# Patient Record
Sex: FEMALE | Race: WHITE | Employment: FULL TIME | URBAN - METROPOLITAN AREA
[De-identification: names, ages, dates, MRNs, and addresses within clinical notes are randomized per-mention and may not be internally consistent; named-entity substitution may affect disease eponyms.]

---

## 2017-06-14 ENCOUNTER — OFFICE VISIT (OUTPATIENT)
Dept: INTERNAL MEDICINE CLINIC | Age: 57
End: 2017-06-14

## 2017-06-14 VITALS
WEIGHT: 236 LBS | BODY MASS INDEX: 37.04 KG/M2 | OXYGEN SATURATION: 95 % | DIASTOLIC BLOOD PRESSURE: 74 MMHG | SYSTOLIC BLOOD PRESSURE: 130 MMHG | HEART RATE: 85 BPM | HEIGHT: 67 IN

## 2017-06-14 DIAGNOSIS — Z00.00 ANNUAL PHYSICAL EXAM: Primary | ICD-10-CM

## 2017-06-14 PROCEDURE — 99396 PREV VISIT EST AGE 40-64: CPT | Performed by: INTERNAL MEDICINE

## 2017-06-29 ENCOUNTER — HOSPITAL ENCOUNTER (OUTPATIENT)
Dept: MAMMOGRAPHY | Age: 57
Discharge: HOME | End: 2017-06-29
Attending: INTERNAL MEDICINE | Admitting: INTERNAL MEDICINE

## 2017-06-29 DIAGNOSIS — Z12.31 VISIT FOR SCREENING MAMMOGRAM: ICD-10-CM

## 2017-07-14 RX ORDER — LEVOTHYROXINE SODIUM 88 MCG
TABLET ORAL
Qty: 30 TABLET | Refills: 5 | Status: SHIPPED | OUTPATIENT
Start: 2017-07-14 | End: 2018-01-29 | Stop reason: SDUPTHER

## 2017-08-22 ENCOUNTER — OFFICE VISIT (OUTPATIENT)
Dept: GYNECOLOGY | Age: 57
End: 2017-08-22

## 2017-08-22 VITALS
RESPIRATION RATE: 17 BRPM | HEIGHT: 68 IN | HEART RATE: 94 BPM | WEIGHT: 255 LBS | TEMPERATURE: 98 F | BODY MASS INDEX: 38.65 KG/M2 | DIASTOLIC BLOOD PRESSURE: 78 MMHG | SYSTOLIC BLOOD PRESSURE: 134 MMHG

## 2017-08-22 DIAGNOSIS — N32.81 URGENCY-FREQUENCY SYNDROME: ICD-10-CM

## 2017-08-22 DIAGNOSIS — Z01.419 WELL WOMAN EXAM WITH ROUTINE GYNECOLOGICAL EXAM: Primary | ICD-10-CM

## 2017-08-22 PROCEDURE — 99386 PREV VISIT NEW AGE 40-64: CPT | Performed by: OBSTETRICS & GYNECOLOGY

## 2017-08-22 ASSESSMENT — ENCOUNTER SYMPTOMS
RESPIRATORY NEGATIVE: 1
GASTROINTESTINAL NEGATIVE: 1
EYES NEGATIVE: 1

## 2017-08-24 LAB
HPV COMMENT: NORMAL
HPV TYPE 16: NOT DETECTED
HPV TYPE 18: NOT DETECTED
HPVOH (OTHER TYPES): NOT DETECTED
URINE CULTURE, ROUTINE: NORMAL

## 2017-12-22 ENCOUNTER — OFFICE VISIT (OUTPATIENT)
Dept: INTERNAL MEDICINE CLINIC | Age: 57
End: 2017-12-22

## 2017-12-22 VITALS
HEART RATE: 77 BPM | SYSTOLIC BLOOD PRESSURE: 134 MMHG | DIASTOLIC BLOOD PRESSURE: 84 MMHG | WEIGHT: 258 LBS | BODY MASS INDEX: 39.23 KG/M2

## 2017-12-22 DIAGNOSIS — R03.0 ELEVATED BLOOD PRESSURE READING WITHOUT DIAGNOSIS OF HYPERTENSION: ICD-10-CM

## 2017-12-22 DIAGNOSIS — E03.9 ACQUIRED HYPOTHYROIDISM: Primary | ICD-10-CM

## 2017-12-22 DIAGNOSIS — E66.9 OBESITY (BMI 30-39.9): ICD-10-CM

## 2017-12-22 PROCEDURE — 99214 OFFICE O/P EST MOD 30 MIN: CPT | Performed by: INTERNAL MEDICINE

## 2017-12-22 RX ORDER — BUPROPION HYDROCHLORIDE 150 MG/1
150 TABLET ORAL EVERY MORNING
Qty: 30 TABLET | Refills: 3 | Status: SHIPPED | OUTPATIENT
Start: 2017-12-22 | End: 2018-02-23 | Stop reason: SDUPTHER

## 2017-12-22 ASSESSMENT — PATIENT HEALTH QUESTIONNAIRE - PHQ9
SUM OF ALL RESPONSES TO PHQ9 QUESTIONS 1 & 2: 0
1. LITTLE INTEREST OR PLEASURE IN DOING THINGS: 0
SUM OF ALL RESPONSES TO PHQ QUESTIONS 1-9: 0
2. FEELING DOWN, DEPRESSED OR HOPELESS: 0

## 2018-01-29 RX ORDER — LEVOTHYROXINE SODIUM 88 MCG
TABLET ORAL
Qty: 30 TABLET | Refills: 5 | Status: SHIPPED | OUTPATIENT
Start: 2018-01-29 | End: 2018-02-23 | Stop reason: SDUPTHER

## 2018-01-29 NOTE — TELEPHONE ENCOUNTER
From: Pilar Waller  Sent: 1/29/2018 7:49 AM EST  Subject: Medication Renewal Request    Jory Alberto would like a refill of the following medications:  SYNTHROID 88 MCG tablet Nazario Her MD]    Preferred pharmacy: [address] [place], [place] [address] [phone] - F [phone]    Comment:  I do not have any refills remaining.

## 2018-02-23 ENCOUNTER — OFFICE VISIT (OUTPATIENT)
Dept: INTERNAL MEDICINE CLINIC | Age: 58
End: 2018-02-23

## 2018-02-23 VITALS
WEIGHT: 246 LBS | BODY MASS INDEX: 37.28 KG/M2 | HEIGHT: 68 IN | SYSTOLIC BLOOD PRESSURE: 110 MMHG | HEART RATE: 82 BPM | DIASTOLIC BLOOD PRESSURE: 80 MMHG

## 2018-02-23 DIAGNOSIS — E66.9 OBESITY (BMI 30-39.9): Primary | ICD-10-CM

## 2018-02-23 PROCEDURE — 99213 OFFICE O/P EST LOW 20 MIN: CPT | Performed by: INTERNAL MEDICINE

## 2018-02-23 RX ORDER — LEVOTHYROXINE SODIUM 88 MCG
88 TABLET ORAL DAILY
Qty: 90 TABLET | Refills: 1 | Status: SHIPPED | OUTPATIENT
Start: 2018-02-23 | End: 2018-08-10 | Stop reason: SDUPTHER

## 2018-02-23 RX ORDER — BUPROPION HYDROCHLORIDE 300 MG/1
300 TABLET ORAL EVERY MORNING
Qty: 90 TABLET | Refills: 1 | Status: SHIPPED | OUTPATIENT
Start: 2018-02-23 | End: 2018-08-10 | Stop reason: SDUPTHER

## 2018-02-23 NOTE — PROGRESS NOTES
[facility] Primary Care  Office Visit   Renate Eng MD    2018    Lesly Jean  :1960    ASSESSMENT/PLAN:   Obesity (BMI 30-39. 9)  BMI decreased. Continue Wellbutrin  mg. Along with her lifestyle modifications. Discussed medications with patient who voiced understanding of their use and indications. All questions answered. No Follow-up on file. Chief Complaint   Patient presents with    Follow-up       HPI:   This 60 y.o. female here for follow-up of Obesity and elevated blood pressure without diagnosis of hypertension. .      Obesity: Started on Wellbutrin XL just prior to . Has had excellent response with control cravings. This cut out all snacking and has lost 12 pounds since the end of December. Initially had nausea when increased from 150-300 mg. But that has subsided. Denies any other side effects. Blood pressure: Patient is an monitoring at home and blood pressure has decreased since patient has begun to lose weight. ROS As per HPI. Patient Active Problem List   Diagnosis    Disc disease, degenerative, lumbar or lumbosacral    History of basal cell carcinoma    History of melanoma in situ    Urge incontinence of urine    History of rectal or anal cancer    Urethral stricture    Hypothyroid    Obesity (BMI 30-39. 9)    Elevated blood pressure reading without diagnosis of hypertension     Prior to Visit Medications    Medication Sig Taking? Authorizing Provider   buPROPion (WELLBUTRIN XL) 300 MG extended release tablet Take 1 tablet by mouth every morning Yes Renate Eng MD   SYNTHROID 88 MCG tablet Take 1 tablet by mouth daily Yes Renate Eng MD   LORazepam (ATIVAN) 0.5 MG tablet Take 1-2 tablets prior to procedures.  Yes Renate Eng MD       OBJECTIVE:  BP Readings from Last 3 Encounters:   18 110/80   17 134/84   17 134/78      Wt Readings from Last 3 Encounters:   18 246 lb (111.6 kg)

## 2018-07-24 ENCOUNTER — HOSPITAL ENCOUNTER (OUTPATIENT)
Dept: MAMMOGRAPHY | Age: 58
Discharge: HOME | End: 2018-07-24
Payer: COMMERCIAL

## 2018-07-24 DIAGNOSIS — Z12.31 VISIT FOR SCREENING MAMMOGRAM: ICD-10-CM

## 2018-07-24 PROCEDURE — 77063 BREAST TOMOSYNTHESIS BI: CPT

## 2018-08-09 NOTE — PROGRESS NOTES
[address] Internal Medicine  Patient Encounter   Sowmya Jones MD    8/10/2018    Ernestine Florez  :1960    ASSESSMENT/PLAN:   Acquired hypothyroidism  Symptomatically euthyroid. Continue current dose of levothyroxine. Check TSH and adjust dose if indicated. Obesity (BMI 30-39. 9)  Steady weight loss since startng wellbutrin XL. Continue medication. Discussed medications with patient who voiced understanding of their use and indications. All questions answered. Return in about 6 months (around 2/10/2019). Medical assistant triage:  Chief Complaint   Patient presents with    6 Month Follow-Up     HPI:   Patient presents for re-evaluation of   1. Acquired hypothyroidism    2. Obesity (BMI 30-39. 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 Today was last day at [employer]. There for 26 years. Involves travel, [place] to [place]. Hypothyroidism: Recent symptoms: none. She denies cold intolerance, heat intolerance, constipation and diarrhea. Patient is  taking her medication consistently on an empty stomach. Obesity: Continues to lose weight. (33#  Total) Averages about 1 pound/week. Attributes to wellbutrin helping her with cravings. Still walks for exercise. ROS As per HPI. Prior to Visit Medications    Medication Sig Taking? Authorizing Provider   buPROPion (WELLBUTRIN XL) 300 MG extended release tablet Take 1 tablet by mouth every morning Yes Sowmya Jones MD   SYNTHROID 88 MCG tablet Take 1 tablet by mouth daily Yes Sowmya Jones MD   LORazepam (ATIVAN) 0.5 MG tablet Take 1-2 tablets prior to procedures.   Sowmya Jones MD     History   Smoking Status    Never Smoker   Smokeless Tobacco    Never Used       OBJECTIVE:  BP Readings from Last 3 Encounters:   08/10/18 108/66   18 110/80   17 134/84      Wt Readings from Last 3 Encounters:   08/10/18 224 lb (101.6 kg)   18 246 lb (111.6 kg)   17 258 lb (117 kg)     Vitals:    08/10/18 1109

## 2018-08-10 ENCOUNTER — OFFICE VISIT (OUTPATIENT)
Dept: INTERNAL MEDICINE CLINIC | Age: 58
End: 2018-08-10

## 2018-08-10 VITALS
HEART RATE: 60 BPM | SYSTOLIC BLOOD PRESSURE: 108 MMHG | DIASTOLIC BLOOD PRESSURE: 66 MMHG | BODY MASS INDEX: 34.06 KG/M2 | RESPIRATION RATE: 16 BRPM | WEIGHT: 224 LBS

## 2018-08-10 DIAGNOSIS — E66.9 OBESITY (BMI 30-39.9): ICD-10-CM

## 2018-08-10 DIAGNOSIS — E03.9 ACQUIRED HYPOTHYROIDISM: Primary | ICD-10-CM

## 2018-08-10 PROBLEM — R03.0 ELEVATED BLOOD PRESSURE READING WITHOUT DIAGNOSIS OF HYPERTENSION: Status: RESOLVED | Noted: 2017-12-22 | Resolved: 2018-08-10

## 2018-08-10 PROCEDURE — 99213 OFFICE O/P EST LOW 20 MIN: CPT | Performed by: INTERNAL MEDICINE

## 2018-08-10 RX ORDER — BUPROPION HYDROCHLORIDE 300 MG/1
300 TABLET ORAL EVERY MORNING
Qty: 30 TABLET | Refills: 5 | Status: SHIPPED | OUTPATIENT
Start: 2018-08-10 | End: 2018-08-30 | Stop reason: SDUPTHER

## 2018-08-10 RX ORDER — LEVOTHYROXINE SODIUM 88 MCG
88 TABLET ORAL DAILY
Qty: 30 TABLET | Refills: 5 | Status: SHIPPED | OUTPATIENT
Start: 2018-08-10 | End: 2019-02-23 | Stop reason: SDUPTHER

## 2018-08-28 RX ORDER — BUPROPION HYDROCHLORIDE 300 MG/1
TABLET ORAL
Qty: 30 TABLET | Refills: 5 | OUTPATIENT
Start: 2018-08-28

## 2018-08-30 RX ORDER — BUPROPION HYDROCHLORIDE 300 MG/1
300 TABLET ORAL EVERY MORNING
Qty: 30 TABLET | Refills: 5 | Status: SHIPPED | OUTPATIENT
Start: 2018-08-30 | End: 2019-08-28 | Stop reason: SDUPTHER

## 2019-02-25 RX ORDER — LEVOTHYROXINE SODIUM 88 MCG
TABLET ORAL
Qty: 30 TABLET | Refills: 1 | Status: SHIPPED | OUTPATIENT
Start: 2019-02-25 | End: 2019-05-03 | Stop reason: SDUPTHER

## 2019-03-19 ENCOUNTER — OFFICE VISIT (OUTPATIENT)
Dept: INTERNAL MEDICINE CLINIC | Age: 59
End: 2019-03-19
Payer: COMMERCIAL

## 2019-03-19 VITALS
HEART RATE: 78 BPM | BODY MASS INDEX: 32.99 KG/M2 | WEIGHT: 217 LBS | SYSTOLIC BLOOD PRESSURE: 120 MMHG | DIASTOLIC BLOOD PRESSURE: 70 MMHG | OXYGEN SATURATION: 98 %

## 2019-03-19 DIAGNOSIS — E03.9 ACQUIRED HYPOTHYROIDISM: Primary | ICD-10-CM

## 2019-03-19 DIAGNOSIS — E66.9 OBESITY (BMI 30-39.9): ICD-10-CM

## 2019-03-19 DIAGNOSIS — R94.4 DECREASED GFR: ICD-10-CM

## 2019-03-19 PROCEDURE — G8417 CALC BMI ABV UP PARAM F/U: HCPCS | Performed by: INTERNAL MEDICINE

## 2019-03-19 PROCEDURE — 1036F TOBACCO NON-USER: CPT | Performed by: INTERNAL MEDICINE

## 2019-03-19 PROCEDURE — 99214 OFFICE O/P EST MOD 30 MIN: CPT | Performed by: INTERNAL MEDICINE

## 2019-03-19 PROCEDURE — G8482 FLU IMMUNIZE ORDER/ADMIN: HCPCS | Performed by: INTERNAL MEDICINE

## 2019-03-19 PROCEDURE — G8427 DOCREV CUR MEDS BY ELIG CLIN: HCPCS | Performed by: INTERNAL MEDICINE

## 2019-03-19 PROCEDURE — 3017F COLORECTAL CA SCREEN DOC REV: CPT | Performed by: INTERNAL MEDICINE

## 2019-03-19 ASSESSMENT — PATIENT HEALTH QUESTIONNAIRE - PHQ9
SUM OF ALL RESPONSES TO PHQ QUESTIONS 1-9: 0
2. FEELING DOWN, DEPRESSED OR HOPELESS: 0
1. LITTLE INTEREST OR PLEASURE IN DOING THINGS: 0
SUM OF ALL RESPONSES TO PHQ9 QUESTIONS 1 & 2: 0
SUM OF ALL RESPONSES TO PHQ QUESTIONS 1-9: 0

## 2019-05-03 RX ORDER — LEVOTHYROXINE SODIUM 88 MCG
TABLET ORAL
Qty: 30 TABLET | Refills: 2 | Status: SHIPPED | OUTPATIENT
Start: 2019-05-03 | End: 2019-05-07 | Stop reason: SDUPTHER

## 2019-05-13 ENCOUNTER — OFFICE VISIT (OUTPATIENT)
Dept: INTERNAL MEDICINE CLINIC | Age: 59
End: 2019-05-13
Payer: COMMERCIAL

## 2019-05-13 ENCOUNTER — TELEPHONE (OUTPATIENT)
Dept: INTERNAL MEDICINE CLINIC | Age: 59
End: 2019-05-13

## 2019-05-13 VITALS
OXYGEN SATURATION: 99 % | SYSTOLIC BLOOD PRESSURE: 124 MMHG | WEIGHT: 214 LBS | HEART RATE: 74 BPM | BODY MASS INDEX: 32.54 KG/M2 | TEMPERATURE: 98 F | RESPIRATION RATE: 16 BRPM | DIASTOLIC BLOOD PRESSURE: 76 MMHG

## 2019-05-13 DIAGNOSIS — A60.1 HERPESVIRAL INFECTION OF PERIANAL SKIN AND RECTUM: Primary | ICD-10-CM

## 2019-05-13 PROCEDURE — 3017F COLORECTAL CA SCREEN DOC REV: CPT | Performed by: INTERNAL MEDICINE

## 2019-05-13 PROCEDURE — G8427 DOCREV CUR MEDS BY ELIG CLIN: HCPCS | Performed by: INTERNAL MEDICINE

## 2019-05-13 PROCEDURE — 99213 OFFICE O/P EST LOW 20 MIN: CPT | Performed by: INTERNAL MEDICINE

## 2019-05-13 PROCEDURE — 1036F TOBACCO NON-USER: CPT | Performed by: INTERNAL MEDICINE

## 2019-05-13 PROCEDURE — G8417 CALC BMI ABV UP PARAM F/U: HCPCS | Performed by: INTERNAL MEDICINE

## 2019-05-13 RX ORDER — VALACYCLOVIR HYDROCHLORIDE 1 G/1
2000 TABLET, FILM COATED ORAL 3 TIMES DAILY
Qty: 21 TABLET | Refills: 0 | Status: SHIPPED | OUTPATIENT
Start: 2019-05-13 | End: 2019-08-28

## 2019-05-13 NOTE — PROGRESS NOTES
Progress Note:    Lizbeth Polo    5/13/2019    Chief Complaint   Patient presents with    Other     \"Bumps\" of anal area, first noticed on Thursday. No pain or itching. (s)Kaleb Polo has had 2 days of \"bumps\" keaton-rectally  Progression: same not resolving  Quality: bumpy, no pain/itching  Radiation: keaton-rectally  Severity: moderate, it's very concerning  Timing: suddenly noticed it  Moderation: none, denies hx of zostervax reception    Patient currently denies chest pain, SOB, NVD, FC, rash, malaise, rigor, dizziness/lightheadness, other pertinent ROS was also reviewed. /76 (Site: Right Upper Arm, Position: Sitting, Cuff Size: Large Adult)   Pulse 74 Comment: Regular  Temp 98 °F (36.7 °C) (Oral)   Resp 16   Wt 214 lb (97.1 kg)   SpO2 99% Comment: Room Air  BMI 32.54 kg/m²   Body mass index is 32.54 kg/m². Gen: Patient appears well groomed, well appearing  HEAD: Atraumatic, normocephalic,   Eyes: PERRLA, EOMI   Neck: supple, no thyroid nodule appreciated, no JVD  Chest: Clear to auscultation CAMDEN, unlabored breathing, normal expansion  Heart: Regular rate, regular rhythm, no murmur, no rub  Extremities: no edema, distal pulses intact  Patient was alert and oriented to person, place and time  Integument: ipsilateral vesicular rash in small clusters with surrounding erythema, perianally and in dermatomal distribution of S2 distribution, it DOES NOT cross midline    Assessment and Plan:    Aretha Helm was seen today for other. 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

## 2019-05-13 NOTE — PATIENT INSTRUCTIONS
Patient Education        Genital Herpes: Care Instructions  Your Care Instructions  Genital herpes is caused by a virus called herpes simplex. There are two types of this virus. Type 2 is the type that usually causes genital herpes. But type 1 can also cause it. Type 1 is the type that causes cold sores. Genital herpes is a sexually transmitted infection (STI). The most common way to get it is through sexual or other contact with someone who has herpes. For example, the virus can spread from a sore in the genital area to the lips. And it can spread from a cold sore on the lips to the genital area. Some people are surprised to find out that they have herpes or that they gave it to someone else. This is because a lot of people who have it don't know that they have it. They may not get sores or they may have sores that they cannot see. But the virus can still be spread by a person who does not have obvious sores or symptoms. There is no cure for herpes, but antiviral medicine can help you feel better and help prevent more outbreaks. This medicine may also lower the chance of spreading the virus. Follow-up care is a key part of your treatment and safety. Be sure to make and go to all appointments, and call your doctor if you are having problems. It's also a good idea to know your test results and keep a list of the medicines you take. How can you care for yourself at home? · Be safe with medicines. If your doctor prescribes medicine, take it exactly as prescribed. Call your doctor if you think you are having a problem with your medicine. You will get more details on the specific medicines your doctor prescribes. · To reduce the pain and itching from herpes sores:  ? Wash the area with water 3 or 4 times a day. ? Keep the sores clean and dry in between baths or showers. You may want to let the sores air dry. This may feel better than a towel. ? Wear cotton underwear. Cotton absorbs moisture well.   ? Take an in to your [facility] account. Enter E733 in the [employer] box to learn more about \"Genital Herpes: Care Instructions. \"     If you do not have an account, please click on the \"Sign Up Now\" link. Current as of: September 11, 2018  Content Version: 12.0  © 6107-9537 Healthwise, Incorporated. Care instructions adapted under license by [facility] ([facility]). If you have questions about a medical condition or this instruction, always ask your healthcare professional. [address] any warranty or liability for your use of this information.

## 2019-05-13 NOTE — TELEPHONE ENCOUNTER
Verified with Dr. Lesly Arreguin, dosing instructions for Valtrex is one tablet tid for seven days for quantity of 21. Shawnee Manley, pharmacist advised.

## 2019-05-15 LAB
GRAM STAIN RESULT: NORMAL
WOUND/ABSCESS: NORMAL

## 2019-05-16 ENCOUNTER — TELEPHONE (OUTPATIENT)
Dept: INTERNAL MEDICINE CLINIC | Age: 59
End: 2019-05-16

## 2019-05-16 NOTE — TELEPHONE ENCOUNTER
Patient returned Julia's call. She would like Julia to call her back with results at number provided. Thanks.

## 2019-08-05 RX ORDER — LEVOTHYROXINE SODIUM 88 MCG
TABLET ORAL
Qty: 30 TABLET | Refills: 2 | Status: SHIPPED | OUTPATIENT
Start: 2019-08-05 | End: 2020-01-24

## 2019-08-28 ENCOUNTER — OFFICE VISIT (OUTPATIENT)
Dept: INTERNAL MEDICINE CLINIC | Age: 59
End: 2019-08-28
Payer: COMMERCIAL

## 2019-08-28 VITALS
HEART RATE: 74 BPM | SYSTOLIC BLOOD PRESSURE: 114 MMHG | HEIGHT: 67 IN | OXYGEN SATURATION: 98 % | WEIGHT: 221 LBS | DIASTOLIC BLOOD PRESSURE: 76 MMHG | BODY MASS INDEX: 34.69 KG/M2

## 2019-08-28 DIAGNOSIS — G25.0 ESSENTIAL TREMOR: ICD-10-CM

## 2019-08-28 DIAGNOSIS — Z00.00 ANNUAL PHYSICAL EXAM: Primary | ICD-10-CM

## 2019-08-28 DIAGNOSIS — E03.9 ACQUIRED HYPOTHYROIDISM: ICD-10-CM

## 2019-08-28 DIAGNOSIS — Z13.220 LIPID SCREENING: ICD-10-CM

## 2019-08-28 DIAGNOSIS — R94.4 DECREASED GFR: ICD-10-CM

## 2019-08-28 DIAGNOSIS — Z13.1 SCREENING FOR DIABETES MELLITUS: ICD-10-CM

## 2019-08-28 LAB
BILIRUBIN URINE: NEGATIVE
BLOOD, URINE: NEGATIVE
CLARITY: CLEAR
COLOR: YELLOW
EPITHELIAL CELLS, UA: 1 /HPF (ref 0–5)
GLUCOSE URINE: NEGATIVE MG/DL
HYALINE CASTS: 1 /LPF (ref 0–8)
KETONES, URINE: NEGATIVE MG/DL
LEUKOCYTE ESTERASE, URINE: NEGATIVE
MICROSCOPIC EXAMINATION: NORMAL
NITRITE, URINE: NEGATIVE
PH UA: 6 (ref 5–8)
PROTEIN UA: NEGATIVE MG/DL
RBC UA: 1 /HPF (ref 0–4)
SPECIFIC GRAVITY UA: 1.01 (ref 1–1.03)
UROBILINOGEN, URINE: 0.2 E.U./DL
WBC UA: 1 /HPF (ref 0–5)

## 2019-08-28 PROCEDURE — 90471 IMMUNIZATION ADMIN: CPT | Performed by: INTERNAL MEDICINE

## 2019-08-28 PROCEDURE — 90472 IMMUNIZATION ADMIN EACH ADD: CPT | Performed by: INTERNAL MEDICINE

## 2019-08-28 PROCEDURE — 90686 IIV4 VACC NO PRSV 0.5 ML IM: CPT | Performed by: INTERNAL MEDICINE

## 2019-08-28 PROCEDURE — 99396 PREV VISIT EST AGE 40-64: CPT | Performed by: INTERNAL MEDICINE

## 2019-08-28 PROCEDURE — 90715 TDAP VACCINE 7 YRS/> IM: CPT | Performed by: INTERNAL MEDICINE

## 2019-08-28 RX ORDER — LORAZEPAM 0.5 MG/1
0.5 TABLET ORAL EVERY 6 HOURS PRN
COMMUNITY
End: 2021-01-01

## 2019-08-28 RX ORDER — BUPROPION HYDROCHLORIDE 300 MG/1
TABLET ORAL
Qty: 30 TABLET | Refills: 5 | Status: SHIPPED | OUTPATIENT
Start: 2019-08-28 | End: 2020-02-24

## 2019-08-28 NOTE — PROGRESS NOTES
History and Physical      Jackelyn Alejo  : 1960    Date of Service: 2019    Chief Complaint:   Jackelyn Alejo is a 60 y.o. female who presents for complete physical examination. HPI:   Here today for physical/wellness care. Mentions has used old Ativan 0.5 mg on flights if mild or window seat. Really helps. Has used 2 in the last year. (rx for anxiety for procedures)    Has noticed some tremor. Usually left hand. Was better on the ativan. 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 No tremor at rest.    Had rash between buttock in May and saw Dr. Karolina Colbert. Went away with antiviral. Never painful but looked looked herpes per Dr. Karolina Colbert and Andria Srivastava. Notes weight is creeping up. WB still helps with cravings but aware that with hr frequent travel and eating out needs to be more careful. Dr. Antonella Stevens every 9 months. Exercise: twice weekly 30-45 min walking    Patient Care Team:  Ty Coon MD as PCP - General (Internal Medicine)  Ty Coon MD as PCP - [facility] Empaneled Provider  Adams Iniguez MD as Consulting Physician (Oncology)  Mehdi Johnston MD (Urology)  Hector Parikh MD as Surgeon (Colon and Rectal Surgery)  Tabatha Lynn (Gynecology)  Cindy Riley MD as Consulting Physician (Radiation Oncology)  Gray Anna MD as Consulting Physician (Dermatology)  Mir Goodwin MD as Consulting Physician (Gastroenterology)    No LMP recorded.  Patient is postmenopausal..      Glucose  Glucose (mg/dL)   Date Value   08/10/2018 89     Lipids  Lab Results   Component Value Date    CHOL 205 (H) 08/10/2018    TRIG 130 08/10/2018    HDL 58 08/10/2018    LDLCALC 121 (H) 08/10/2018       Health Maintenance   Topic Date Due    Shingles Vaccine (1 of 2) 2009    DTaP/Tdap/Td vaccine (2 - Td) 06/10/2019    TSH testing  08/10/2019    Flu vaccine (1) 2019    Colon cancer screen colonoscopy  05/03/2020    Breast cancer screen  07/24/2020    Cervical cancer screen  08/22/2022    Lipid screen  08/10/2023    Hepatitis C screen  Completed    HIV screen  Completed    Pneumococcal 0-64 years Vaccine  Aged [employer] History   Administered Date(s) Administered    Influenza 10/12/2015    Influenza Virus Vaccine 10/30/2014    Influenza, Kassandra Band, 3 Years and older, IM (Fluzone, Afluria) 10/26/2016, 10/17/2017, 12/01/2018    Tdap (Boostrix, Adacel) 06/10/2009       No Known Allergies    Outpatient Medications Marked as Taking for the 8/28/19 encounter (Office Visit) with Cameron Solorio MD   Medication Sig Dispense Refill    LORazepam (ATIVAN) 0.5 MG tablet Take 0.5 mg by mouth every 6 hours as needed for Anxiety.       SYNTHROID 88 MCG tablet TAKE ONE TABLET BY MOUTH DAILY 30 tablet 2    buPROPion (WELLBUTRIN XL) 300 MG extended release tablet Take 1 tablet by mouth every morning 30 tablet 5       Past Medical History:   Diagnosis Date    Adenomatous colon polyp     Basal cell carcinoma of skin     left upper arm    Degenerative joint disease     Disc disease, degenerative, lumbar or lumbosacral     lumbar area 1997    Diverticulosis     Ectopic pregnancy     History of rectal or anal cancer 4/10    Squamous cell of anal canal, s/p chemo and radiation    Hypothyroid 9/12    Infertility     treated with pergonol for 5 years 0516-5687, 7 rounds of ivf    MELANOMA IN SITU 2006    left upper arm    Obesity     Postmenopausal 2010    Urinary incontinence      Past Surgical History:   Procedure Laterality Date    TUNNELED VENOUS PORT PLACEMENT      summer 2009; placement and removal    VARICOSE VEIN SURGERY  6/08     Family History   Problem Relation Age of Onset    High Blood Pressure Mother     Colon Cancer Father 61    High Blood Pressure Father     Hypertension Brother      Social History     Socioeconomic History    Marital status:      Spouse name: Eliecer Denise Normocephalic and atraumatic. Mouth/Throat: Oropharynx is clear and moist.   Eyes: Pupils are equal, round, and reactive to light. Conjunctivae and EOM are normal.   Neck: Normal range of motion. Neck supple. No thyromegaly present. Cardiovascular: Normal rate, regular rhythm and intact distal pulses. Pulmonary/Chest: Effort normal and breath sounds normal.   Abdominal: Soft. Bowel sounds are normal. She exhibits no mass. There is no hepatosplenomegaly. There is no tenderness. Musculoskeletal: She exhibits no edema. Lymphadenopathy:     She has no cervical adenopathy. Neurological: She is alert. Skin: Skin is warm and dry. No pallor. Psychiatric: She has a normal mood and affect. Assessment/Plan:  Annual PE/Wellness exam  Discussed age appropriate preventive care including healthy diet, daily exercise, immunizations and age & gender guided screening tests. Tdap and flu today  Screening labs  Work on diet. Weight creeping up. Essential tremor  Mild. Advised cut back on caffeine. 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 Patient wants to hold off at this time    Acquired hypothyroidism  -     TSH with Reflex; Future    Decreased GFR. Check UA and renal today. Return in about 6 months (around 2/28/2020).   Poncho Yuan MD

## 2019-09-05 ENCOUNTER — TELEPHONE (OUTPATIENT)
Dept: INTERNAL MEDICINE CLINIC | Age: 59
End: 2019-09-05

## 2019-09-06 ENCOUNTER — HOSPITAL ENCOUNTER (OUTPATIENT)
Dept: ULTRASOUND IMAGING | Age: 59
Discharge: HOME | End: 2019-09-06
Payer: COMMERCIAL

## 2019-09-06 ENCOUNTER — TELEPHONE (OUTPATIENT)
Dept: INTERNAL MEDICINE CLINIC | Age: 59
End: 2019-09-06

## 2019-09-06 DIAGNOSIS — R94.4 DECREASED GFR: ICD-10-CM

## 2019-09-06 PROCEDURE — 76770 US EXAM ABDO BACK WALL COMP: CPT

## 2019-12-17 ENCOUNTER — OFFICE VISIT (OUTPATIENT)
Dept: GYNECOLOGY | Age: 59
End: 2019-12-17
Payer: COMMERCIAL

## 2019-12-17 VITALS
HEART RATE: 70 BPM | RESPIRATION RATE: 17 BRPM | SYSTOLIC BLOOD PRESSURE: 122 MMHG | HEIGHT: 68 IN | WEIGHT: 224 LBS | BODY MASS INDEX: 33.95 KG/M2 | DIASTOLIC BLOOD PRESSURE: 76 MMHG

## 2019-12-17 DIAGNOSIS — Z01.419 WELL WOMAN EXAM WITH ROUTINE GYNECOLOGICAL EXAM: Primary | ICD-10-CM

## 2019-12-17 PROCEDURE — 99396 PREV VISIT EST AGE 40-64: CPT | Performed by: OBSTETRICS & GYNECOLOGY

## 2019-12-17 PROCEDURE — G8482 FLU IMMUNIZE ORDER/ADMIN: HCPCS | Performed by: OBSTETRICS & GYNECOLOGY

## 2019-12-17 ASSESSMENT — ENCOUNTER SYMPTOMS
BACK PAIN: 1
RESPIRATORY NEGATIVE: 1
EYES NEGATIVE: 1
GASTROINTESTINAL NEGATIVE: 1

## 2019-12-20 ENCOUNTER — HOSPITAL ENCOUNTER (OUTPATIENT)
Dept: MAMMOGRAPHY | Age: 59
Discharge: HOME | End: 2019-12-20
Payer: COMMERCIAL

## 2019-12-20 DIAGNOSIS — Z01.419 WELL WOMAN EXAM WITH ROUTINE GYNECOLOGICAL EXAM: ICD-10-CM

## 2019-12-20 LAB
HPV COMMENT: NORMAL
HPV TYPE 16: NOT DETECTED
HPV TYPE 18: NOT DETECTED
HPVOH (OTHER TYPES): NOT DETECTED

## 2019-12-20 PROCEDURE — 77063 BREAST TOMOSYNTHESIS BI: CPT

## 2020-01-01 ENCOUNTER — OFFICE VISIT (OUTPATIENT)
Dept: INTERNAL MEDICINE CLINIC | Age: 60
End: 2020-01-01
Payer: COMMERCIAL

## 2020-01-01 ENCOUNTER — NURSE ONLY (OUTPATIENT)
Dept: INTERNAL MEDICINE CLINIC | Age: 60
End: 2020-01-01
Payer: COMMERCIAL

## 2020-01-01 VITALS
DIASTOLIC BLOOD PRESSURE: 80 MMHG | HEIGHT: 68 IN | WEIGHT: 231 LBS | BODY MASS INDEX: 35.01 KG/M2 | SYSTOLIC BLOOD PRESSURE: 116 MMHG | RESPIRATION RATE: 14 BRPM | OXYGEN SATURATION: 97 % | TEMPERATURE: 97.6 F | HEART RATE: 83 BPM

## 2020-01-01 PROCEDURE — G8482 FLU IMMUNIZE ORDER/ADMIN: HCPCS | Performed by: INTERNAL MEDICINE

## 2020-01-01 PROCEDURE — 99396 PREV VISIT EST AGE 40-64: CPT | Performed by: INTERNAL MEDICINE

## 2020-01-01 PROCEDURE — 90471 IMMUNIZATION ADMIN: CPT | Performed by: INTERNAL MEDICINE

## 2020-01-01 PROCEDURE — 90750 HZV VACC RECOMBINANT IM: CPT | Performed by: INTERNAL MEDICINE

## 2020-01-24 RX ORDER — LEVOTHYROXINE SODIUM 88 MCG
TABLET ORAL
Qty: 30 TABLET | Refills: 5 | Status: SHIPPED | OUTPATIENT
Start: 2020-01-24 | End: 2020-08-02

## 2020-02-24 RX ORDER — BUPROPION HYDROCHLORIDE 300 MG/1
TABLET ORAL
Qty: 30 TABLET | Refills: 5 | Status: SHIPPED | OUTPATIENT
Start: 2020-02-24 | End: 2020-09-02

## 2020-06-02 ENCOUNTER — VIRTUAL VISIT (OUTPATIENT)
Dept: INTERNAL MEDICINE CLINIC | Age: 60
End: 2020-06-02
Payer: COMMERCIAL

## 2020-06-02 VITALS — WEIGHT: 225 LBS | BODY MASS INDEX: 34.21 KG/M2

## 2020-06-02 PROCEDURE — 99213 OFFICE O/P EST LOW 20 MIN: CPT | Performed by: INTERNAL MEDICINE

## 2020-06-02 PROCEDURE — 3017F COLORECTAL CA SCREEN DOC REV: CPT | Performed by: INTERNAL MEDICINE

## 2020-06-02 PROCEDURE — G8427 DOCREV CUR MEDS BY ELIG CLIN: HCPCS | Performed by: INTERNAL MEDICINE

## 2020-06-02 SDOH — ECONOMIC STABILITY: FOOD INSECURITY: WITHIN THE PAST 12 MONTHS, YOU WORRIED THAT YOUR FOOD WOULD RUN OUT BEFORE YOU GOT MONEY TO BUY MORE.: NEVER TRUE

## 2020-06-02 SDOH — ECONOMIC STABILITY: FOOD INSECURITY: WITHIN THE PAST 12 MONTHS, THE FOOD YOU BOUGHT JUST DIDN'T LAST AND YOU DIDN'T HAVE MONEY TO GET MORE.: NEVER TRUE

## 2020-06-02 SDOH — ECONOMIC STABILITY: INCOME INSECURITY: HOW HARD IS IT FOR YOU TO PAY FOR THE VERY BASICS LIKE FOOD, HOUSING, MEDICAL CARE, AND HEATING?: NOT HARD AT ALL

## 2020-06-02 ASSESSMENT — PATIENT HEALTH QUESTIONNAIRE - PHQ9
2. FEELING DOWN, DEPRESSED OR HOPELESS: 0
SUM OF ALL RESPONSES TO PHQ9 QUESTIONS 1 & 2: 0
SUM OF ALL RESPONSES TO PHQ QUESTIONS 1-9: 0
1. LITTLE INTEREST OR PLEASURE IN DOING THINGS: 0
SUM OF ALL RESPONSES TO PHQ QUESTIONS 1-9: 0

## 2020-08-02 RX ORDER — LEVOTHYROXINE SODIUM 88 MCG
TABLET ORAL
Qty: 30 TABLET | Refills: 4 | Status: SHIPPED
Start: 2020-08-02 | End: 2020-09-14 | Stop reason: CLARIF

## 2020-08-04 RX ORDER — LEVOTHYROXINE SODIUM 88 MCG
TABLET ORAL
Qty: 30 TABLET | Refills: 4 | OUTPATIENT
Start: 2020-08-04

## 2020-08-04 NOTE — TELEPHONE ENCOUNTER
Last appointment: 8/28/2019  Next appointment: 12/22/2020  Last refill: 08/03/2020    Spoke with pharmacy who advises medication previously refilled on 08/03/2020.   Duplicate request.

## 2020-08-07 DIAGNOSIS — E03.9 ACQUIRED HYPOTHYROIDISM: ICD-10-CM

## 2020-08-07 DIAGNOSIS — Z13.1 SCREENING FOR DIABETES MELLITUS: ICD-10-CM

## 2020-08-07 DIAGNOSIS — Z13.220 LIPID SCREENING: ICD-10-CM

## 2020-08-07 LAB
ANION GAP SERPL CALCULATED.3IONS-SCNC: 11 MMOL/L (ref 3–16)
BUN BLDV-MCNC: 24 MG/DL (ref 7–20)
CALCIUM SERPL-MCNC: 9.2 MG/DL (ref 8.3–10.6)
CHLORIDE BLD-SCNC: 104 MMOL/L (ref 99–110)
CHOLESTEROL, TOTAL: 218 MG/DL (ref 0–199)
CO2: 27 MMOL/L (ref 21–32)
CREAT SERPL-MCNC: 0.9 MG/DL (ref 0.6–1.2)
GFR AFRICAN AMERICAN: >60
GFR NON-AFRICAN AMERICAN: >60
GLUCOSE BLD-MCNC: 84 MG/DL (ref 70–99)
HDLC SERPL-MCNC: 62 MG/DL (ref 40–60)
LDL CHOLESTEROL CALCULATED: 140 MG/DL
POTASSIUM SERPL-SCNC: 4.8 MMOL/L (ref 3.5–5.1)
SODIUM BLD-SCNC: 142 MMOL/L (ref 136–145)
T4 FREE: 1.2 NG/DL (ref 0.9–1.8)
TRIGL SERPL-MCNC: 81 MG/DL (ref 0–150)
TSH REFLEX: 4.31 UIU/ML (ref 0.27–4.2)
VLDLC SERPL CALC-MCNC: 16 MG/DL

## 2020-08-08 LAB
ESTIMATED AVERAGE GLUCOSE: 116.9 MG/DL
HBA1C MFR BLD: 5.7 %

## 2020-08-25 DIAGNOSIS — E03.9 ACQUIRED HYPOTHYROIDISM: ICD-10-CM

## 2020-08-25 LAB — TSH REFLEX: 2.92 UIU/ML (ref 0.27–4.2)

## 2020-09-02 RX ORDER — BUPROPION HYDROCHLORIDE 300 MG/1
TABLET ORAL
Qty: 30 TABLET | Refills: 4 | Status: SHIPPED | OUTPATIENT
Start: 2020-09-02 | End: 2021-01-01

## 2020-09-14 ENCOUNTER — PATIENT MESSAGE (OUTPATIENT)
Dept: INTERNAL MEDICINE CLINIC | Age: 60
End: 2020-09-14

## 2020-09-14 RX ORDER — LEVOTHYROXINE SODIUM 88 UG/1
88 TABLET ORAL DAILY
Qty: 30 TABLET | Refills: 5 | Status: SHIPPED | OUTPATIENT
Start: 2020-09-14 | End: 2021-01-01

## 2020-09-14 RX ORDER — LEVOTHYROXINE SODIUM 88 MCG
TABLET ORAL
Qty: 30 TABLET | Refills: 4 | Status: CANCELLED | OUTPATIENT
Start: 2020-09-14

## 2020-10-28 ENCOUNTER — NURSE ONLY (OUTPATIENT)
Dept: INTERNAL MEDICINE CLINIC | Age: 60
End: 2020-10-28
Payer: COMMERCIAL

## 2020-10-28 PROCEDURE — 90686 IIV4 VACC NO PRSV 0.5 ML IM: CPT | Performed by: INTERNAL MEDICINE

## 2020-10-28 PROCEDURE — 90471 IMMUNIZATION ADMIN: CPT | Performed by: INTERNAL MEDICINE

## 2020-12-22 NOTE — PROGRESS NOTES
History and Physical      Kanika Montaño  : 1960    Date of Service: 2020    Chief Complaint:   Kanika Mnotaño is a 63 y.o. female who presents for complete physical examination. HPI:   Here today for physical/wellness care. Had colonoscopy in . Had mild dysplasia, and then saw colorectal who cauterized. Notes weight is creeping up. At this point, attributes to less activity during COVID-19 pandemic. Dr. Mack Benz every 6 months now. Interested in seeing a new dermatologist.  Exercise: twice weekly 30-45 min walking    Patient Care Team:  Nery Francisco MD as PCP - General (Internal Medicine)  Nery Francisco MD as PCP - [facility] Empaneled Provider  Rajinder Springer MD as Consulting Physician (Oncology)  Leonela Shipley MD (Urology)  Davi Andrade (Gynecology)  Evy Taylor MD as Consulting Physician (Radiation Oncology)  Alexander Ibrahim MD as Consulting Physician (Dermatology)  Unruly Singh MD as Consulting Physician (Gastroenterology)  Mckay Durbin MD as Obstetrician (Obstetrics & Gynecology)  Elizabeth Tenorio    No LMP recorded.  Patient is postmenopausal..      Glucose  Glucose (mg/dL)   Date Value   2020 84     Lipids  Lab Results   Component Value Date    CHOL 218 (H) 2020    TRIG 81 2020    HDL 62 (H) 2020    LDLCALC 140 (H) 2020       Health Maintenance   Topic Date Due    Shingles Vaccine (1 of 2) 2009    A1C test (Diabetic or Prediabetic)  2021    Breast cancer screen  2021    Colon cancer screen colonoscopy  2023    Cervical cancer screen  2024    Lipid screen  2025    DTaP/Tdap/Td vaccine (3 - Td) 2029    Flu vaccine  Completed    Hepatitis C screen  Completed    HIV screen  Completed    Hepatitis A vaccine  Aged Out    Hepatitis B vaccine  Aged Out    Hib vaccine  Aged Out    Meningococcal (ACWY) vaccine  Aged Out    Pneumococcal 0-64 years Vaccine  Aged [employer] Immunization History   Administered Date(s) Administered    Influenza 10/12/2015    Influenza Virus Vaccine 10/30/2014    Influenza, Ora Mono, IM, (6 mo and older Fluzone, Flulaval, Fluarix and 3 yrs and older Afluria) 10/26/2016, 10/17/2017, 12/01/2018    Influenza, Ora Mono, IM, PF (6 mo and older Fluzone, Flulaval, Fluarix, and 3 yrs and older Afluria) 08/28/2019, 10/28/2020    Tdap (Boostrix, Adacel) 06/10/2009, 08/28/2019       No Known Allergies    Outpatient Medications Marked as Taking for the 12/22/20 encounter (Office Visit) with Tyrel Leung MD   Medication Sig Dispense Refill    levothyroxine (SYNTHROID) 88 MCG tablet Take 1 tablet by mouth daily 30 tablet 5    buPROPion (WELLBUTRIN XL) 300 MG extended release tablet TAKE ONE TABLET BY MOUTH EVERY MORNING 30 tablet 4    LORazepam (ATIVAN) 0.5 MG tablet Take 0.5 mg by mouth every 6 hours as needed for Anxiety.          Past Medical History:   Diagnosis Date    Adenomatous colon polyp     Basal cell carcinoma of skin     left upper arm    Degenerative joint disease     Disc disease, degenerative, lumbar or lumbosacral     lumbar area 1997    Diverticulosis     Ectopic pregnancy     History of rectal or anal cancer 4/10    Squamous cell of anal canal, s/p chemo and radiation    Hypothyroid 9/12    Infertility     treated with pergonol for 5 years 5571-9981, 7 rounds of ivf    MELANOMA IN SITU 2006    left upper arm    Obesity     Postmenopausal 2010    Urinary incontinence      Past Surgical History:   Procedure Laterality Date    TUNNELED VENOUS PORT PLACEMENT      summer 2009; placement and removal    VARICOSE VEIN SURGERY  6/08     Family History   Problem Relation Age of Onset    High Blood Pressure Mother     Colon Cancer Father 61    High Blood Pressure Father     Hypertension Brother      Social History     Socioeconomic History    Marital status:      Spouse name: Sohail Ruvalcaba Number of children: 1 Head: Normocephalic and atraumatic. Eyes:      Conjunctiva/sclera: Conjunctivae normal.      Pupils: Pupils are equal, round, and reactive to light. Neck:      Musculoskeletal: Normal range of motion and neck supple. Thyroid: No thyromegaly. Cardiovascular:      Rate and Rhythm: Normal rate and regular rhythm. Pulmonary:      Effort: Pulmonary effort is normal.      Breath sounds: Normal breath sounds. Abdominal:      General: Bowel sounds are normal.      Palpations: Abdomen is soft. There is no mass. Tenderness: There is no abdominal tenderness. Lymphadenopathy:      Cervical: No cervical adenopathy. Skin:     General: Skin is warm and dry. Coloration: Skin is not pale. Neurological:      Mental Status: She is alert. The 10-year ASCVD risk score (Zayra Aguilar, et al., 2013) is: 3%    Values used to calculate the score:      Age: 63 years      Sex: Female      Is Non- : No      Diabetic: No      Tobacco smoker: No      Systolic Blood Pressure: 337 mmHg      Is BP treated: No      HDL Cholesterol: 62 mg/dL      Total Cholesterol: 218 mg/dL    Assessment/Plan:  Annual PE/Wellness exam  Discussed age appropriate preventive care including healthy diet, daily exercise, immunizations and age & gender guided screening tests. Encouraged to work more on activity level due to the gradual weight increase she has been experiencing. Return in about 6 months (around 6/22/2021). Okay to schedule nurse visit for Gavin next week.

## 2021-01-01 ENCOUNTER — PATIENT MESSAGE (OUTPATIENT)
Dept: INTERNAL MEDICINE CLINIC | Age: 61
End: 2021-01-01

## 2021-01-01 ENCOUNTER — TELEPHONE (OUTPATIENT)
Dept: INTERNAL MEDICINE CLINIC | Age: 61
End: 2021-01-01

## 2021-01-01 ENCOUNTER — HOSPITAL ENCOUNTER (OUTPATIENT)
Dept: CT IMAGING | Age: 61
Discharge: HOME | End: 2021-02-01
Payer: COMMERCIAL

## 2021-01-01 ENCOUNTER — HOSPITAL ENCOUNTER (EMERGENCY)
Age: 61
Discharge: HOME | End: 2021-01-22
Attending: EMERGENCY MEDICINE
Payer: COMMERCIAL

## 2021-01-01 ENCOUNTER — OFFICE VISIT (OUTPATIENT)
Dept: PULMONOLOGY | Age: 61
End: 2021-01-01
Payer: COMMERCIAL

## 2021-01-01 ENCOUNTER — VIRTUAL VISIT (OUTPATIENT)
Dept: INTERNAL MEDICINE CLINIC | Age: 61
End: 2021-01-01
Payer: COMMERCIAL

## 2021-01-01 ENCOUNTER — HOSPITAL ENCOUNTER (OUTPATIENT)
Dept: CT IMAGING | Age: 61
Discharge: HOME | End: 2021-11-11
Payer: COMMERCIAL

## 2021-01-01 ENCOUNTER — HOSPITAL ENCOUNTER (OUTPATIENT)
Dept: MAMMOGRAPHY | Age: 61
Discharge: HOME | End: 2021-04-21
Payer: COMMERCIAL

## 2021-01-01 ENCOUNTER — OFFICE VISIT (OUTPATIENT)
Dept: INTERNAL MEDICINE CLINIC | Age: 61
End: 2021-01-01
Payer: COMMERCIAL

## 2021-01-01 ENCOUNTER — CARE COORDINATION (OUTPATIENT)
Dept: CARE COORDINATION | Age: 61
End: 2021-01-01

## 2021-01-01 ENCOUNTER — OFFICE VISIT (OUTPATIENT)
Dept: GYNECOLOGY | Age: 61
End: 2021-01-01
Payer: COMMERCIAL

## 2021-01-01 ENCOUNTER — HOSPITAL ENCOUNTER (OUTPATIENT)
Dept: NON INVASIVE DIAGNOSTICS | Age: 61
Discharge: HOME | End: 2021-03-04
Payer: COMMERCIAL

## 2021-01-01 ENCOUNTER — HOSPITAL ENCOUNTER (OUTPATIENT)
Age: 61
Discharge: HOME | End: 2021-11-09
Payer: COMMERCIAL

## 2021-01-01 ENCOUNTER — HOSPITAL ENCOUNTER (OUTPATIENT)
Age: 61
Discharge: HOME | End: 2021-01-26
Payer: COMMERCIAL

## 2021-01-01 ENCOUNTER — NURSE ONLY (OUTPATIENT)
Dept: INTERNAL MEDICINE CLINIC | Age: 61
End: 2021-01-01
Payer: COMMERCIAL

## 2021-01-01 ENCOUNTER — HOSPITAL ENCOUNTER (OUTPATIENT)
Dept: CT IMAGING | Age: 61
Discharge: HOME | End: 2021-11-22
Payer: COMMERCIAL

## 2021-01-01 ENCOUNTER — HOSPITAL ENCOUNTER (OUTPATIENT)
Dept: GENERAL RADIOLOGY | Age: 61
Discharge: HOME | End: 2021-11-09
Payer: COMMERCIAL

## 2021-01-01 ENCOUNTER — HOSPITAL ENCOUNTER (OUTPATIENT)
Dept: GENERAL RADIOLOGY | Age: 61
Discharge: HOME | End: 2021-01-26
Payer: COMMERCIAL

## 2021-01-01 ENCOUNTER — OFFICE VISIT (OUTPATIENT)
Dept: PRIMARY CARE CLINIC | Age: 61
End: 2021-01-01
Payer: COMMERCIAL

## 2021-01-01 ENCOUNTER — TELEPHONE (OUTPATIENT)
Dept: PULMONOLOGY | Age: 61
End: 2021-01-01

## 2021-01-01 VITALS
HEART RATE: 91 BPM | TEMPERATURE: 98.8 F | RESPIRATION RATE: 18 BRPM | DIASTOLIC BLOOD PRESSURE: 73 MMHG | OXYGEN SATURATION: 93 % | SYSTOLIC BLOOD PRESSURE: 124 MMHG

## 2021-01-01 VITALS
WEIGHT: 218 LBS | SYSTOLIC BLOOD PRESSURE: 121 MMHG | HEART RATE: 85 BPM | TEMPERATURE: 96 F | BODY MASS INDEX: 33.04 KG/M2 | OXYGEN SATURATION: 96 % | DIASTOLIC BLOOD PRESSURE: 79 MMHG | HEIGHT: 68 IN

## 2021-01-01 VITALS
DIASTOLIC BLOOD PRESSURE: 62 MMHG | SYSTOLIC BLOOD PRESSURE: 130 MMHG | HEIGHT: 68 IN | BODY MASS INDEX: 34.56 KG/M2 | OXYGEN SATURATION: 97 % | TEMPERATURE: 97.3 F | WEIGHT: 228 LBS | HEART RATE: 90 BPM | RESPIRATION RATE: 14 BRPM

## 2021-01-01 VITALS
HEART RATE: 78 BPM | RESPIRATION RATE: 18 BRPM | TEMPERATURE: 97.9 F | SYSTOLIC BLOOD PRESSURE: 132 MMHG | DIASTOLIC BLOOD PRESSURE: 88 MMHG | WEIGHT: 223 LBS | OXYGEN SATURATION: 98 % | HEIGHT: 68 IN | BODY MASS INDEX: 33.8 KG/M2

## 2021-01-01 VITALS
OXYGEN SATURATION: 98 % | HEART RATE: 85 BPM | BODY MASS INDEX: 34.09 KG/M2 | WEIGHT: 224.2 LBS | DIASTOLIC BLOOD PRESSURE: 78 MMHG | SYSTOLIC BLOOD PRESSURE: 122 MMHG

## 2021-01-01 VITALS
BODY MASS INDEX: 35.46 KG/M2 | HEART RATE: 76 BPM | DIASTOLIC BLOOD PRESSURE: 70 MMHG | SYSTOLIC BLOOD PRESSURE: 118 MMHG | OXYGEN SATURATION: 99 % | WEIGHT: 233.2 LBS

## 2021-01-01 VITALS — WEIGHT: 224 LBS | BODY MASS INDEX: 33.95 KG/M2 | HEIGHT: 68 IN

## 2021-01-01 VITALS
BODY MASS INDEX: 33.8 KG/M2 | RESPIRATION RATE: 16 BRPM | TEMPERATURE: 98.5 F | HEART RATE: 71 BPM | DIASTOLIC BLOOD PRESSURE: 78 MMHG | OXYGEN SATURATION: 99 % | WEIGHT: 223 LBS | HEIGHT: 68 IN | SYSTOLIC BLOOD PRESSURE: 154 MMHG

## 2021-01-01 VITALS
HEART RATE: 79 BPM | HEIGHT: 68 IN | TEMPERATURE: 98 F | BODY MASS INDEX: 34.4 KG/M2 | RESPIRATION RATE: 17 BRPM | SYSTOLIC BLOOD PRESSURE: 134 MMHG | DIASTOLIC BLOOD PRESSURE: 79 MMHG | WEIGHT: 227 LBS

## 2021-01-01 DIAGNOSIS — K76.89 NODULE ON LIVER: Primary | ICD-10-CM

## 2021-01-01 DIAGNOSIS — R05.9 COUGH: ICD-10-CM

## 2021-01-01 DIAGNOSIS — R79.82 ELEVATED C-REACTIVE PROTEIN (CRP): ICD-10-CM

## 2021-01-01 DIAGNOSIS — R53.83 FATIGUE, UNSPECIFIED TYPE: ICD-10-CM

## 2021-01-01 DIAGNOSIS — E03.9 ACQUIRED HYPOTHYROIDISM: ICD-10-CM

## 2021-01-01 DIAGNOSIS — M54.50 LUMBAR BACK PAIN: Primary | ICD-10-CM

## 2021-01-01 DIAGNOSIS — M79.10 MYALGIA: ICD-10-CM

## 2021-01-01 DIAGNOSIS — R05.9 COUGH: Primary | ICD-10-CM

## 2021-01-01 DIAGNOSIS — Z01.419 WELL WOMAN EXAM WITH ROUTINE GYNECOLOGICAL EXAM: Primary | ICD-10-CM

## 2021-01-01 DIAGNOSIS — M54.50 ACUTE BILATERAL LOW BACK PAIN WITHOUT SCIATICA: Primary | ICD-10-CM

## 2021-01-01 DIAGNOSIS — M54.50 LUMBAR BACK PAIN: ICD-10-CM

## 2021-01-01 DIAGNOSIS — Z20.822 SUSPECTED COVID-19 VIRUS INFECTION: Primary | ICD-10-CM

## 2021-01-01 DIAGNOSIS — R79.89 ELEVATED BRAIN NATRIURETIC PEPTIDE (BNP) LEVEL: ICD-10-CM

## 2021-01-01 DIAGNOSIS — R70.0 ELEVATED SED RATE: ICD-10-CM

## 2021-01-01 DIAGNOSIS — R53.81 MALAISE AND FATIGUE: ICD-10-CM

## 2021-01-01 DIAGNOSIS — R53.83 FATIGUE, UNSPECIFIED TYPE: Primary | ICD-10-CM

## 2021-01-01 DIAGNOSIS — M54.50 ACUTE MIDLINE LOW BACK PAIN WITHOUT SCIATICA: ICD-10-CM

## 2021-01-01 DIAGNOSIS — E66.9 OBESITY (BMI 30-39.9): ICD-10-CM

## 2021-01-01 DIAGNOSIS — R53.83 MALAISE AND FATIGUE: Primary | ICD-10-CM

## 2021-01-01 DIAGNOSIS — Z13.820 ENCOUNTER FOR SCREENING FOR OSTEOPOROSIS: Primary | ICD-10-CM

## 2021-01-01 DIAGNOSIS — M54.50 ACUTE MIDLINE LOW BACK PAIN WITHOUT SCIATICA: Primary | ICD-10-CM

## 2021-01-01 DIAGNOSIS — Z01.419 WELL WOMAN EXAM WITH ROUTINE GYNECOLOGICAL EXAM: ICD-10-CM

## 2021-01-01 DIAGNOSIS — R93.89 ABNORMAL X-RAY: ICD-10-CM

## 2021-01-01 DIAGNOSIS — R53.81 MALAISE AND FATIGUE: Primary | ICD-10-CM

## 2021-01-01 DIAGNOSIS — E03.9 ACQUIRED HYPOTHYROIDISM: Primary | ICD-10-CM

## 2021-01-01 DIAGNOSIS — R53.83 MALAISE AND FATIGUE: ICD-10-CM

## 2021-01-01 DIAGNOSIS — R59.1 LYMPHADENOPATHY: ICD-10-CM

## 2021-01-01 DIAGNOSIS — R91.8 MULTIPLE PULMONARY NODULES: ICD-10-CM

## 2021-01-01 DIAGNOSIS — Z13.220 LIPID SCREENING: ICD-10-CM

## 2021-01-01 DIAGNOSIS — Z23 NEED FOR ZOSTER VACCINATION: Primary | ICD-10-CM

## 2021-01-01 DIAGNOSIS — R34 DECREASED URINATION: ICD-10-CM

## 2021-01-01 DIAGNOSIS — B34.9 VIRAL SYNDROME: ICD-10-CM

## 2021-01-01 DIAGNOSIS — K76.89 NODULE ON LIVER: ICD-10-CM

## 2021-01-01 DIAGNOSIS — Z85.048 HISTORY OF RECTAL OR ANAL CANCER: ICD-10-CM

## 2021-01-01 DIAGNOSIS — R16.0 LIVER MASS: ICD-10-CM

## 2021-01-01 DIAGNOSIS — F41.9 ANXIETY: Primary | ICD-10-CM

## 2021-01-01 DIAGNOSIS — R10.84 GENERALIZED ABDOMINAL PAIN: ICD-10-CM

## 2021-01-01 LAB
A/G RATIO: 1.2 (ref 1.1–2.2)
A/G RATIO: 1.3 (ref 1.1–2.2)
ALBUMIN SERPL-MCNC: 3.7 G/DL (ref 3.4–5)
ALBUMIN SERPL-MCNC: 3.8 G/DL (ref 3.4–5)
ALP BLD-CCNC: 109 U/L (ref 40–129)
ALP BLD-CCNC: 90 U/L (ref 40–129)
ALT SERPL-CCNC: 17 U/L (ref 10–40)
ALT SERPL-CCNC: 22 U/L (ref 10–40)
AMYLASE: 35 U/L (ref 25–115)
ANION GAP SERPL CALCULATED.3IONS-SCNC: 13 MMOL/L (ref 3–16)
ANION GAP SERPL CALCULATED.3IONS-SCNC: 16 MMOL/L (ref 3–16)
ANTI-DSDNA IGG: 1 IU/ML (ref 0–9)
ANTI-NUCLEAR ANTIBODY (ANA): NEGATIVE
APPEARANCE FLUID: CLEAR
APTT: 35.5 SEC (ref 26.2–38.6)
AST SERPL-CCNC: 17 U/L (ref 15–37)
AST SERPL-CCNC: 21 U/L (ref 15–37)
BASOPHILS ABSOLUTE: 0 K/UL (ref 0–0.2)
BASOPHILS RELATIVE PERCENT: 0.4 %
BILIRUB SERPL-MCNC: 0.6 MG/DL (ref 0–1)
BILIRUB SERPL-MCNC: 0.8 MG/DL (ref 0–1)
BILIRUBIN URINE: NEGATIVE
BILIRUBIN, POC: NEGATIVE
BLOOD URINE, POC: NEGATIVE
BLOOD, URINE: NEGATIVE
BUN BLDV-MCNC: 15 MG/DL (ref 7–20)
BUN BLDV-MCNC: 20 MG/DL (ref 7–20)
C-REACTIVE PROTEIN: 121.7 MG/L (ref 0–5.1)
C-REACTIVE PROTEIN: 52.3 MG/L (ref 0–5.1)
CALCIUM SERPL-MCNC: 9.1 MG/DL (ref 8.3–10.6)
CALCIUM SERPL-MCNC: 9.5 MG/DL (ref 8.3–10.6)
CHLORIDE BLD-SCNC: 102 MMOL/L (ref 99–110)
CHLORIDE BLD-SCNC: 97 MMOL/L (ref 99–110)
CLARITY, POC: NORMAL
CLARITY: CLEAR
CO2: 24 MMOL/L (ref 21–32)
CO2: 26 MMOL/L (ref 21–32)
COLOR, POC: YELLOW
COLOR: YELLOW
COMMENT UA: ABNORMAL
CREAT SERPL-MCNC: 0.8 MG/DL (ref 0.6–1.2)
CREAT SERPL-MCNC: 0.9 MG/DL (ref 0.6–1.2)
EOSINOPHILS ABSOLUTE: 0.1 K/UL (ref 0–0.6)
EOSINOPHILS RELATIVE PERCENT: 0.7 %
EPITHELIAL CELLS, UA: 3 /HPF (ref 0–5)
GFR AFRICAN AMERICAN: >60
GFR AFRICAN AMERICAN: >60
GFR NON-AFRICAN AMERICAN: >60
GFR NON-AFRICAN AMERICAN: >60
GLOBULIN: 2.9 G/DL
GLOBULIN: 3.1 G/DL
GLUCOSE BLD-MCNC: 87 MG/DL (ref 70–99)
GLUCOSE BLD-MCNC: 99 MG/DL (ref 70–99)
GLUCOSE URINE, POC: NEGATIVE
GLUCOSE URINE: NEGATIVE MG/DL
HCT VFR BLD CALC: 37.1 % (ref 36–48)
HCT VFR BLD CALC: 41.5 % (ref 36–48)
HEMOGLOBIN: 12.2 G/DL (ref 12–16)
HEMOGLOBIN: 13.8 G/DL (ref 12–16)
HYALINE CASTS: 1 /LPF (ref 0–8)
INR BLD: 1.39 (ref 0.88–1.12)
KETONES, POC: NORMAL
KETONES, URINE: 15 MG/DL
LEUKOCYTE EST, POC: NEGATIVE
LEUKOCYTE ESTERASE, URINE: ABNORMAL
LV EF: 55 %
LVEF MODALITY: NORMAL
LYMPHOCYTES ABSOLUTE: 1.3 K/UL (ref 1–5.1)
LYMPHOCYTES RELATIVE PERCENT: 15.1 %
MCH RBC QN AUTO: 31.5 PG (ref 26–34)
MCH RBC QN AUTO: 31.8 PG (ref 26–34)
MCHC RBC AUTO-ENTMCNC: 33 G/DL (ref 31–36)
MCHC RBC AUTO-ENTMCNC: 33.3 G/DL (ref 31–36)
MCV RBC AUTO: 95.3 FL (ref 80–100)
MCV RBC AUTO: 95.7 FL (ref 80–100)
MICROSCOPIC EXAMINATION: YES
MONOCYTES ABSOLUTE: 0.8 K/UL (ref 0–1.3)
MONOCYTES RELATIVE PERCENT: 9.6 %
NEUTROPHILS ABSOLUTE: 6.4 K/UL (ref 1.7–7.7)
NEUTROPHILS RELATIVE PERCENT: 74.2 %
NITRITE, POC: NEGATIVE
NITRITE, URINE: NEGATIVE
PDW BLD-RTO: 13.7 % (ref 12.4–15.4)
PDW BLD-RTO: 14.6 % (ref 12.4–15.4)
PH UA: 6 (ref 5–8)
PH, POC: 6
PLATELET # BLD: 305 K/UL (ref 135–450)
PLATELET # BLD: 349 K/UL (ref 135–450)
PLATELET # BLD: 362 K/UL (ref 135–450)
PMV BLD AUTO: 10 FL (ref 5–10.5)
PMV BLD AUTO: 9 FL (ref 5–10.5)
POTASSIUM SERPL-SCNC: 4.2 MMOL/L (ref 3.5–5.1)
POTASSIUM SERPL-SCNC: 4.2 MMOL/L (ref 3.5–5.1)
PRO-BNP: 561 PG/ML (ref 0–124)
PRO-BNP: 564 PG/ML (ref 0–124)
PROTEIN UA: 30 MG/DL
PROTEIN, POC: NORMAL
PROTHROMBIN TIME: 15.9 SEC (ref 9.9–12.7)
RBC # BLD: 3.89 M/UL (ref 4–5.2)
RBC # BLD: 4.33 M/UL (ref 4–5.2)
RBC UA: 1 /HPF (ref 0–4)
SARS-COV-2 ANTIBODY, TOTAL: NEGATIVE
SARS-COV-2, NAA: NOT DETECTED
SARS-COV-2: NORMAL
SEDIMENTATION RATE, ERYTHROCYTE: 95 MM/HR (ref 0–30)
SODIUM BLD-SCNC: 136 MMOL/L (ref 136–145)
SODIUM BLD-SCNC: 142 MMOL/L (ref 136–145)
SPECIFIC GRAVITY UA: 1.01 (ref 1–1.03)
SPECIFIC GRAVITY, POC: 1.02
TOTAL CK: 48 U/L (ref 26–192)
TOTAL PROTEIN: 6.6 G/DL (ref 6.4–8.2)
TOTAL PROTEIN: 6.9 G/DL (ref 6.4–8.2)
URINE CULTURE, ROUTINE: NORMAL
URINE REFLEX TO CULTURE: ABNORMAL
URINE TYPE: ABNORMAL
UROBILINOGEN, POC: NEGATIVE
UROBILINOGEN, URINE: 1 E.U./DL
WBC # BLD: 8.4 K/UL (ref 4–11)
WBC # BLD: 8.7 K/UL (ref 4–11)
WBC UA: 8 /HPF (ref 0–5)

## 2021-01-01 PROCEDURE — G8484 FLU IMMUNIZE NO ADMIN: HCPCS | Performed by: INTERNAL MEDICINE

## 2021-01-01 PROCEDURE — 1036F TOBACCO NON-USER: CPT | Performed by: INTERNAL MEDICINE

## 2021-01-01 PROCEDURE — 90750 HZV VACC RECOMBINANT IM: CPT | Performed by: INTERNAL MEDICINE

## 2021-01-01 PROCEDURE — G8427 DOCREV CUR MEDS BY ELIG CLIN: HCPCS | Performed by: INTERNAL MEDICINE

## 2021-01-01 PROCEDURE — 99211 OFF/OP EST MAY X REQ PHY/QHP: CPT | Performed by: NURSE PRACTITIONER

## 2021-01-01 PROCEDURE — 3017F COLORECTAL CA SCREEN DOC REV: CPT | Performed by: INTERNAL MEDICINE

## 2021-01-01 PROCEDURE — 99214 OFFICE O/P EST MOD 30 MIN: CPT | Performed by: INTERNAL MEDICINE

## 2021-01-01 PROCEDURE — 72131 CT LUMBAR SPINE W/O DYE: CPT

## 2021-01-01 PROCEDURE — 71250 CT THORAX DX C-: CPT

## 2021-01-01 PROCEDURE — 88341 IMHCHEM/IMCYTCHM EA ADD ANTB: CPT

## 2021-01-01 PROCEDURE — 93306 TTE W/DOPPLER COMPLETE: CPT

## 2021-01-01 PROCEDURE — 88342 IMHCHEM/IMCYTCHM 1ST ANTB: CPT

## 2021-01-01 PROCEDURE — 7100000010 HC PHASE II RECOVERY - FIRST 15 MIN

## 2021-01-01 PROCEDURE — 85049 AUTOMATED PLATELET COUNT: CPT

## 2021-01-01 PROCEDURE — 99283 EMERGENCY DEPT VISIT LOW MDM: CPT

## 2021-01-01 PROCEDURE — 88307 TISSUE EXAM BY PATHOLOGIST: CPT

## 2021-01-01 PROCEDURE — G8417 CALC BMI ABV UP PARAM F/U: HCPCS | Performed by: INTERNAL MEDICINE

## 2021-01-01 PROCEDURE — 6370000000 HC RX 637 (ALT 250 FOR IP): Performed by: EMERGENCY MEDICINE

## 2021-01-01 PROCEDURE — 85610 PROTHROMBIN TIME: CPT

## 2021-01-01 PROCEDURE — 77063 BREAST TOMOSYNTHESIS BI: CPT

## 2021-01-01 PROCEDURE — 72100 X-RAY EXAM L-S SPINE 2/3 VWS: CPT

## 2021-01-01 PROCEDURE — 81002 URINALYSIS NONAUTO W/O SCOPE: CPT | Performed by: INTERNAL MEDICINE

## 2021-01-01 PROCEDURE — 90471 IMMUNIZATION ADMIN: CPT | Performed by: INTERNAL MEDICINE

## 2021-01-01 PROCEDURE — 36415 COLL VENOUS BLD VENIPUNCTURE: CPT

## 2021-01-01 PROCEDURE — 2709999900 CT NEEDLE BIOPSY LIVER PERCUTANEOUS

## 2021-01-01 PROCEDURE — 99396 PREV VISIT EST AGE 40-64: CPT | Performed by: OBSTETRICS & GYNECOLOGY

## 2021-01-01 PROCEDURE — 71046 X-RAY EXAM CHEST 2 VIEWS: CPT

## 2021-01-01 PROCEDURE — 2500000003 HC RX 250 WO HCPCS: Performed by: RADIOLOGY

## 2021-01-01 PROCEDURE — 6360000002 HC RX W HCPCS: Performed by: RADIOLOGY

## 2021-01-01 PROCEDURE — 99204 OFFICE O/P NEW MOD 45 MIN: CPT | Performed by: INTERNAL MEDICINE

## 2021-01-01 PROCEDURE — 88313 SPECIAL STAINS GROUP 2: CPT

## 2021-01-01 PROCEDURE — G8428 CUR MEDS NOT DOCUMENT: HCPCS | Performed by: NURSE PRACTITIONER

## 2021-01-01 PROCEDURE — G8482 FLU IMMUNIZE ORDER/ADMIN: HCPCS | Performed by: INTERNAL MEDICINE

## 2021-01-01 PROCEDURE — G8417 CALC BMI ABV UP PARAM F/U: HCPCS | Performed by: NURSE PRACTITIONER

## 2021-01-01 PROCEDURE — 7100000011 HC PHASE II RECOVERY - ADDTL 15 MIN

## 2021-01-01 PROCEDURE — 85730 THROMBOPLASTIN TIME PARTIAL: CPT

## 2021-01-01 RX ORDER — LEVOTHYROXINE SODIUM 88 UG/1
TABLET ORAL
Qty: 30 TABLET | Refills: 5 | Status: SHIPPED | OUTPATIENT
Start: 2021-01-01

## 2021-01-01 RX ORDER — PREDNISONE 10 MG/1
TABLET ORAL
Qty: 20 TABLET | Refills: 0 | Status: SHIPPED | OUTPATIENT
Start: 2021-01-01

## 2021-01-01 RX ORDER — METHYLPREDNISOLONE 4 MG/1
TABLET ORAL
Qty: 1 KIT | Refills: 0 | Status: SHIPPED | OUTPATIENT
Start: 2021-01-01 | End: 2021-01-01

## 2021-01-01 RX ORDER — ACETAMINOPHEN 500 MG
1000 TABLET ORAL ONCE
Status: COMPLETED | OUTPATIENT
Start: 2021-01-01 | End: 2021-01-01

## 2021-01-01 RX ORDER — BUPROPION HYDROCHLORIDE 300 MG/1
TABLET ORAL
Qty: 30 TABLET | Refills: 3 | Status: SHIPPED | OUTPATIENT
Start: 2021-01-01

## 2021-01-01 RX ORDER — AMOXICILLIN 500 MG/1
1000 CAPSULE ORAL 3 TIMES DAILY
Qty: 42 CAPSULE | Refills: 0 | Status: SHIPPED | OUTPATIENT
Start: 2021-01-01 | End: 2021-01-01

## 2021-01-01 RX ORDER — LIDOCAINE HYDROCHLORIDE 20 MG/ML
5 INJECTION, SOLUTION EPIDURAL; INFILTRATION; INTRACAUDAL; PERINEURAL ONCE
Status: COMPLETED | OUTPATIENT
Start: 2021-01-01 | End: 2021-01-01

## 2021-01-01 RX ORDER — ACETAMINOPHEN 325 MG/1
650 TABLET ORAL EVERY 4 HOURS PRN
Status: DISCONTINUED | OUTPATIENT
Start: 2021-01-01 | End: 2021-01-01 | Stop reason: HOSPADM

## 2021-01-01 RX ORDER — FENTANYL CITRATE 50 UG/ML
50 INJECTION, SOLUTION INTRAMUSCULAR; INTRAVENOUS ONCE
Status: COMPLETED | OUTPATIENT
Start: 2021-01-01 | End: 2021-01-01

## 2021-01-01 RX ORDER — IBUPROFEN 400 MG/1
800 TABLET ORAL ONCE
Status: COMPLETED | OUTPATIENT
Start: 2021-01-01 | End: 2021-01-01

## 2021-01-01 RX ORDER — LORAZEPAM 0.5 MG/1
TABLET ORAL
Qty: 12 TABLET | Refills: 0 | Status: SHIPPED | OUTPATIENT
Start: 2021-01-01 | End: 2021-12-11

## 2021-01-01 RX ORDER — ALBUTEROL SULFATE 90 UG/1
2 AEROSOL, METERED RESPIRATORY (INHALATION) 4 TIMES DAILY PRN
Qty: 18 G | Refills: 0 | Status: SHIPPED | OUTPATIENT
Start: 2021-01-01

## 2021-01-01 RX ORDER — MIDAZOLAM HYDROCHLORIDE 1 MG/ML
1 INJECTION INTRAMUSCULAR; INTRAVENOUS ONCE
Status: COMPLETED | OUTPATIENT
Start: 2021-01-01 | End: 2021-01-01

## 2021-01-01 RX ORDER — HYDROCODONE POLISTIREX AND CHLORPHENIRAMINE POLISTIREX 10; 8 MG/5ML; MG/5ML
5 SUSPENSION, EXTENDED RELEASE ORAL NIGHTLY PRN
Qty: 120 ML | Refills: 0 | Status: SHIPPED | OUTPATIENT
Start: 2021-01-01 | End: 2021-01-01 | Stop reason: SDUPTHER

## 2021-01-01 RX ORDER — ONDANSETRON 4 MG/1
4 TABLET, FILM COATED ORAL 3 TIMES DAILY PRN
Qty: 15 TABLET | Refills: 0 | Status: SHIPPED | OUTPATIENT
Start: 2021-01-01 | End: 2021-01-01 | Stop reason: ALTCHOICE

## 2021-01-01 RX ORDER — HYDROCODONE POLISTIREX AND CHLORPHENIRAMINE POLISTIREX 10; 8 MG/5ML; MG/5ML
5 SUSPENSION, EXTENDED RELEASE ORAL NIGHTLY PRN
Qty: 120 ML | Refills: 0 | Status: SHIPPED | OUTPATIENT
Start: 2021-01-01 | End: 2021-12-24

## 2021-01-01 RX ORDER — CYCLOBENZAPRINE HCL 10 MG
10 TABLET ORAL 3 TIMES DAILY PRN
Qty: 21 TABLET | Refills: 0 | Status: SHIPPED | OUTPATIENT
Start: 2021-01-01 | End: 2021-01-01

## 2021-01-01 RX ORDER — GUAIFENESIN AND CODEINE PHOSPHATE 100; 10 MG/5ML; MG/5ML
5 SOLUTION ORAL EVERY 4 HOURS PRN
Qty: 236 ML | Refills: 0 | Status: SHIPPED | OUTPATIENT
Start: 2021-01-01 | End: 2021-01-01

## 2021-01-01 RX ORDER — BENZONATATE 100 MG/1
CAPSULE ORAL
Qty: 30 CAPSULE | Refills: 0 | Status: SHIPPED | OUTPATIENT
Start: 2021-01-01 | End: 2021-01-01

## 2021-01-01 RX ORDER — AZITHROMYCIN 250 MG/1
TABLET, FILM COATED ORAL
Qty: 6 TABLET | Refills: 0 | Status: SHIPPED | OUTPATIENT
Start: 2021-01-01 | End: 2021-01-01

## 2021-01-01 RX ADMIN — LIDOCAINE HYDROCHLORIDE 5 ML: 20 INJECTION, SOLUTION EPIDURAL; INFILTRATION; INTRACAUDAL; PERINEURAL at 09:15

## 2021-01-01 RX ADMIN — IBUPROFEN 800 MG: 400 TABLET, FILM COATED ORAL at 02:20

## 2021-01-01 RX ADMIN — FENTANYL CITRATE 50 MCG: 50 INJECTION, SOLUTION INTRAMUSCULAR; INTRAVENOUS at 09:15

## 2021-01-01 RX ADMIN — ACETAMINOPHEN 1000 MG: 500 TABLET ORAL at 02:20

## 2021-01-01 RX ADMIN — MIDAZOLAM HYDROCHLORIDE 1 MG: 2 INJECTION, SOLUTION INTRAMUSCULAR; INTRAVENOUS at 09:15

## 2021-01-01 SDOH — ECONOMIC STABILITY: FOOD INSECURITY: WITHIN THE PAST 12 MONTHS, YOU WORRIED THAT YOUR FOOD WOULD RUN OUT BEFORE YOU GOT MONEY TO BUY MORE.: NEVER TRUE

## 2021-01-01 SDOH — ECONOMIC STABILITY: FOOD INSECURITY: WITHIN THE PAST 12 MONTHS, THE FOOD YOU BOUGHT JUST DIDN'T LAST AND YOU DIDN'T HAVE MONEY TO GET MORE.: NEVER TRUE

## 2021-01-01 ASSESSMENT — PAIN SCALES - GENERAL
PAINLEVEL_OUTOF10: 0
PAINLEVEL_OUTOF10: 8
PAINLEVEL_OUTOF10: 5
PAINLEVEL_OUTOF10: 0
PAINLEVEL_OUTOF10: 8

## 2021-01-01 ASSESSMENT — ENCOUNTER SYMPTOMS
RHINORRHEA: 1
COUGH: 0
SHORTNESS OF BREATH: 0
EYES NEGATIVE: 1
GASTROINTESTINAL NEGATIVE: 1
WHEEZING: 0
CHEST TIGHTNESS: 0
COUGH: 1
SHORTNESS OF BREATH: 0
COUGH: 0
RESPIRATORY NEGATIVE: 1
RESPIRATORY NEGATIVE: 1

## 2021-01-01 ASSESSMENT — PAIN DESCRIPTION - ORIENTATION: ORIENTATION: MID

## 2021-01-01 ASSESSMENT — PATIENT HEALTH QUESTIONNAIRE - PHQ9
SUM OF ALL RESPONSES TO PHQ QUESTIONS 1-9: 0
2. FEELING DOWN, DEPRESSED OR HOPELESS: 0

## 2021-01-01 ASSESSMENT — PAIN DESCRIPTION - DESCRIPTORS: DESCRIPTORS: ACHING;CONSTANT;DULL

## 2021-01-01 ASSESSMENT — PAIN DESCRIPTION - LOCATION: LOCATION: BACK

## 2021-01-01 ASSESSMENT — SOCIAL DETERMINANTS OF HEALTH (SDOH): HOW HARD IS IT FOR YOU TO PAY FOR THE VERY BASICS LIKE FOOD, HOUSING, MEDICAL CARE, AND HEATING?: NOT HARD AT ALL

## 2021-01-18 NOTE — TELEPHONE ENCOUNTER
Patient states she haven't been able to have a BM on her own for about 3 days. Patient starts she is having body aches so a COVID test was scheduled for today. Patient denies any other symptoms at this time. Patient is scheduled for a vv 1/19/21.

## 2021-01-18 NOTE — TELEPHONE ENCOUNTER
Patient is requesting Dr. Herlinda Payan recommendation regarding the following symptoms:    How many days? 2 days  Nasal Drainage? no  Bringing up Phlegm?no  Sore throat? No  Headache? No  Ear Pain? No  SOB? No          Wheezing? No  Head Congestion? No  Chest Congestion? No  Body Aches? Yes  Vomiting? No  Diarrhea? No   Constipation:  No BM Friday, took a Laxative, had no BM Saturday, so took another laxative. Today she has Diarrhea,   Temp? No-  Lethargic:  Yes  If so, highest temp: 99.1 only 1 time. Meds tried so far? Laxative     JOHN MACIASFormerly Mercy Hospital SouthROMAN Yuan, [address] [place] [address] [phone]     Please contact patient @ phone # provided. 89yo M, with PMH/o gastric MALToma, CLL, spinal stenosis s/p back surgery, c/o flank pain, cough, and fever, found to have L empyema on CT chest now s/p IR-guided chest tube insertion on 2/27, receiving tPA/dornase treatment, with hospital course complicated by GUS from [facility], receiving HD, and new development of rapid afib. and hypotension.      NEURO: Continues to have episodes of delirium, received Seroquel 12.5mg at bedtime. Will d/c Seroquel and Melatonin. Likely 2/2 uremia, continue with reorientation measures.    CV: Developed new onset afib with RVR two nights ago but has since returned to NSR. Cardio following. Amio load started. Continue to total of 5g load, followed by 200mg qD. AC not recommended at this time d/t hematuria and bloody output from chest tube. C/w Midodrine and statin. Monitor for hypotension or bradycardia as amio is loaded.    PULM: CT chest on admission demonstrating loculated L pleural effusion/empyema, cannot exclude underlying neoplasm. S/p IR-guided chest tube placement on 2/27 outputting serosanguinous fluid. tPA and dornase treatments completed. Repeat chest CR demonstrating unchanged loculated L fluid collection with pigtail inferior to fluid collection. Family expresses wishes for less invasive therapies and patient is poor candidate for VATS. Plan to pull chest tube out today with XR immediately after and again after 6hrs.    GI: C/w renal diet. H/o gastric MALToma not currently receiving treatment.    : Developed GUS from ATN likely 2/2 Vancomycin. S/p 2 sessions of HD. Discuss with nephro plan for further dialysis. Cr increased again today. Pt outputted 875cc urine within 24hrs. C/w Phoslo. Coude catheter in place draining clear yellow urine.    ID: Course of Zosyn completed for possible empyema. Continue to monitor off abx. Currently afebrile. Mild leukocytosis noted, appears to fluctuate, likely 2/2 underlying CLL.    HEME: Known h/o CLL not receiving tx at this time. Underlying malignancy cannot be excluded as cause of effusion/empyema. Plan to have goals of care discussion with family. C/w Heparin as DVT ppx. Monitor daily H/H, transfuse if Hb <7    ENDO: No active issues at this time.    Dispo: Continue with ICU level of care.

## 2021-01-18 NOTE — TELEPHONE ENCOUNTER
Needs clarification of this message. Please call for more info. See why she actually called. Due to the diarrhea and then constipation? Body aches? Wants appointment?  If truly body aches, should also schedule her for covid testing

## 2021-01-18 NOTE — PROGRESS NOTES
Aby Tye Alberto received a viral test for COVID-19. They were educated on isolation and quarantine as appropriate. For any symptoms, they were directed to seek care from their PCP, given contact information to establish with a doctor, directed to an urgent care or the emergency room.

## 2021-01-19 NOTE — PROGRESS NOTES
TELEHEALTH EVALUATION -- Audio/Visual (During KVTKG-35 public health emergency)  2021  Marsha Alberto (: 1960)      ASSESSMENT/PLAN:  1. Malaise and fatigue  2. Myalgia  3. Decreased urination  4. Generalized abdominal pain  In the midst of the COVID-19 pandemic with local surge, symptoms would be consistent with Covid infection. Broad and concerning differential however. Expecting Covid results this afternoon. If positive, have instructed patient on self management. Is in the process of obtaining a pulse oximeter. Covid is negative, she needs additional evaluation today. Patient needs a UA renal, hepatic, CBC, but difficulty in obtaining these when she is potentially infectious with Covid. Concern for intra-abdominal process such as acute hepatitis, possible partial small bowel obstruction and pyelonephritis. Advised patient to increase fluid intake. Written for nausea as needed  Contact the office in the event of new symptoms or worsening. SUBJECTIVE/OBJECTIVE:  63 y.o. female here for evaluation of the following chief complaint(s):   Chief Complaint   Patient presents with    Fatigue     lethargic, dark color urine, no appetite, occasional nausea, abdominal cramping & back pain, temp 99.6     HPI  Doing really poorly since since . Woke up with significant fatigue lethargy. Eats that she feels as bad as she did after chemo. No BM on Friday or Saturday but took laxative and caused diarrhea. Stomach cramping as well. Never problems with constipation. Low grade temps to 99.6. Body aches, no back bothering. Smell and taste still ok. No cough, sore throat or headache. Hard time sleeping because so uncomfortable last night. Today is feeling a bit better than she has in the last 3 days. Review of Systems   HENT: Positive for rhinorrhea (very brief this am only). Respiratory: Negative for cough and shortness of breath. Genitourinary: Negative for dysuria and frequency. Musculoskeletal: Positive for myalgias. Neurological: Negative for dizziness and headaches. Patient-Reported Vitals 1/19/2021   Patient-Reported Weight 227.4   Patient-Reported Height 57   Patient-Reported Systolic 300   Patient-Reported Diastolic 62   Patient-Reported Pulse 62   Patient-Reported Temperature 98.6        Physical Exam  Constitutional:       General: She is not in acute distress. Appearance: Normal appearance. She is not ill-appearing. Pulmonary:      Effort: Pulmonary effort is normal.   Skin:     Coloration: Skin is not pale. Neurological:      General: No focal deficit present. Mental Status: She is alert. Psychiatric:         Mood and Affect: Mood normal.       Due to this being a TeleHealth encounter, evaluation of the following organ systems is limited: Vitals/Constitutional/EENT/Resp/CV/GI//MS/Neuro/Skin/Heme-Lymph-Imm. Margaret Morales is a 63 y.o. female being evaluated by a Virtual Visit (video visit) encounter to address concerns as mentioned above. A caregiver was present when appropriate. Due to this being a TeleHealth encounter (During TUKUV-59 public health emergency), evaluation of the following organ systems was limited: Vitals/Constitutional/EENT/Resp/CV/GI//MS/Neuro/Skin/Heme-Lymph-Imm. Pursuant to the emergency declaration under the [address] [place] [address], [address] [place] [address] authority and the [employer] and [employer] Act, this Virtual Visit was conducted with patient's (and/or legal guardian's) consent, to reduce the patient's risk of exposure to COVID-19 and provide necessary medical care. The patient (and/or legal guardian) has also been advised to contact this office for worsening conditions or problems, and seek emergency medical treatment and/or call 911 if deemed necessary. Patient identification was verified at the start of the visit: yes    Services were provided through a video synchronous discussion virtually to substitute for in-person clinic visit. Patient and provider were located at their individual homes. An electronic signature was used to authenticate this note.     --Sho Weiss MD

## 2021-01-22 NOTE — ED TRIAGE NOTES
States she hasn't had lower back pain for the past 20 years and then all of a sudden here it is. Pain is an 8 of 10. History of degenerative disc disease. No appetite and fatigue. Denies CP, SOB, N,V,D, burning with urination. Still has taste and smell. Denies taking blood thinners. Here for the intense lower back pain.

## 2021-01-22 NOTE — TELEPHONE ENCOUNTER
Spoke with patient. Abdominal symptoms have completely resolved. Now more focused in the low back region. Has some concern that the ER doctor commented on the fact that she did not hurt with bending. Using Advil and Tylenol. Now has prescription for Flexeril as well, and has been advised to move around more. Covid test was negative. Patient report back to the office on Monday if she has not improved, or if she experiences any worsening.

## 2021-01-22 NOTE — TELEPHONE ENCOUNTER
Patient and her  are concerned because she was treated today for lower back pain. She has not been well all week-back pain, fatigue and lack of appetite. Please call to discuss their concerns.

## 2021-01-22 NOTE — ED PROVIDER NOTES
[address] [facility]          ATTENDING PHYSICIAN NOTE       Date of evaluation: 1/22/2021    Chief Complaint     Back Pain      History of Present Illness     Eugenia Kitchen is a 63 y.o. female who presents with 5 days of gradual onset midline lower back discomfort that is aching in nature. She says this reminds her of back pain that she had earlier in her youth. She denies any bowel or bladder changes fevers abdominal pain vomiting constipation melena hematochezia nausea vomiting diarrhea or any other concerns. She has had no discomfort with bowel movements. Review of Systems     Review of Systems   Constitutional: Negative for fever. HENT: Negative. Respiratory: Negative. Cardiovascular: Negative. Genitourinary: Negative. Musculoskeletal: Negative for arthralgias, gait problem, myalgias, neck pain and neck stiffness. Skin: Negative for rash. Neurological: Negative for dizziness, syncope, weakness, light-headedness and headaches. Psychiatric/Behavioral: Negative for confusion. All other systems reviewed and are negative. Past Medical, Surgical, Family, and Social History     She has a past medical history of Adenomatous colon polyp, Basal cell carcinoma of skin, Degenerative joint disease, Disc disease, degenerative, lumbar or lumbosacral, Diverticulosis, Ectopic pregnancy, History of rectal or anal cancer, Hypothyroid, Infertility, MELANOMA IN SITU, Obesity, Postmenopausal, and Urinary incontinence. She has a past surgical history that includes Varicose vein surgery (6/08) and Tunneled venous port placement. Her family history includes Colon Cancer (age of onset: 61) in her father; High Blood Pressure in her father and mother; Hypertension in her brother. She reports that she has never smoked. She has never used smokeless tobacco. She reports that she does not drink alcohol or use drugs.     Medications     Previous Medications BUPROPION (WELLBUTRIN XL) 300 MG EXTENDED RELEASE TABLET    TAKE ONE TABLET BY MOUTH EVERY MORNING    LEVOTHYROXINE (SYNTHROID) 88 MCG TABLET    Take 1 tablet by mouth daily       Allergies     She has No Known Allergies. Physical Exam     INITIAL VITALS: BP: (!) 154/78, Temp: 98.5 °F (36.9 °C), Pulse: 71, Resp: 16, SpO2: 99 %   Physical Exam  Vitals signs reviewed. Constitutional:       Appearance: Normal appearance. Comments: Well-appearing   Eyes:      Extraocular Movements: Extraocular movements intact. Pupils: Pupils are equal, round, and reactive to light. Neck:      Musculoskeletal: Normal range of motion and neck supple. Cardiovascular:      Rate and Rhythm: Normal rate and regular rhythm. Heart sounds: Normal heart sounds. Pulmonary:      Effort: Pulmonary effort is normal.      Breath sounds: Normal breath sounds. Abdominal:      General: Abdomen is flat. Bowel sounds are normal.      Palpations: Abdomen is soft. Tenderness: There is no abdominal tenderness. Musculoskeletal:         General: No swelling or tenderness. Comments: Bilateral paraspinal muscular tenderness without midline spine tenderness at approximately L5. No tenderness extending downward towards the anal verge. Logroll negative bilateral.  Straight leg negative bilaterally. Legs neurovascularly intact   Skin:     General: Skin is warm and dry. Neurological:      General: No focal deficit present. Mental Status: She is alert and oriented to person, place, and time. Comments: 5-5 motor strength normal sensation, otherwise grossly nonfocal   Psychiatric:         Mood and Affect: Mood normal.         Behavior: Behavior normal.         Thought Content: Thought content normal.         Judgment: Judgment normal.         Diagnostic Results       RADIOLOGY:  No orders to display       LABS:   No results found for this visit on 01/22/21. RECENT VITALS:  BP: (!) 154/78,Temp: 98.5 °F (36.9 °C), Pulse: 71, Resp: 16, SpO2: 99 %       ED Course     Nursing Notes, Past Medical Hx, Past Surgical Hx, Social Hx,Allergies, and Family Hx were reviewed. patient was given the following medications:  Orders Placed This Encounter   Medications    acetaminophen (TYLENOL) tablet 1,000 mg    ibuprofen (ADVIL;MOTRIN) tablet 800 mg    cyclobenzaprine (FLEXERIL) 10 MG tablet     Sig: Take 1 tablet by mouth 3 times daily as needed for Muscle spasms     Dispense:  21 tablet     Refill:  0       CONSULTS:  None    MEDICAL DECISIONMAKING / ASSESSMENT / PLAN     Yazan Mccord is a 63 y.o. female who presents with symptoms consistent with musculoskeletal back discomfort. Notably her symptoms have spontaneously resolved to a significant though not total degree while here in the emergency department. I see no signs of spinal pathology or referred pain from a intra-abdominal source. I do not suspect perirectal abscess or neoplastic process. No report of trauma, and spontaneous fracture would be unlikely given the improvement in the patient symptoms. Risks and benefits of cross-sectional imaging discussed and patient has elected to defer this at this time. Discharge in good condition. Clinical Impression     1. Acute bilateral low back pain without sciatica        Disposition     PATIENT REFERRED TO:  No follow-up provider specified.     DISCHARGE MEDICATIONS:  New Prescriptions    CYCLOBENZAPRINE (FLEXERIL) 10 MG TABLET    Take 1 tablet by mouth 3 times daily as needed for Muscle spasms       DISPOSITION Decision To Discharge 01/22/2021 02:15:15 AM        Sarbjit Henderson MD  01/22/21 [id number]

## 2021-01-22 NOTE — ED NOTES
Patient discharged to home with family. Patient verbalized understanding of discharge instructions. Advised of when to return to ED and when to call 911. Advised of follow up with PCP. Patient received 1 Rx with education. Patient verbalized understanding of education. No questions from patient to this RN. Patient left ED without incident.       Pushpa Pacheco, ADOLFO  01/22/21 [id number]

## 2021-01-26 PROBLEM — M54.50 ACUTE MIDLINE LOW BACK PAIN WITHOUT SCIATICA: Status: ACTIVE | Noted: 2021-01-01

## 2021-01-26 NOTE — PROGRESS NOTES
(Office Visit) with Anita Barrios MD   Medication Sig Dispense Refill    levothyroxine (SYNTHROID) 88 MCG tablet Take 1 tablet by mouth daily 30 tablet 5    buPROPion (WELLBUTRIN XL) 300 MG extended release tablet TAKE ONE TABLET BY MOUTH EVERY MORNING 30 tablet 4     Physical Exam  Cardiovascular:      Heart sounds: Normal heart sounds. Pulmonary:      Breath sounds: Normal breath sounds. Abdominal:      General: Bowel sounds are normal.      Tenderness: There is no abdominal tenderness. Musculoskeletal:      Comments: Tender over lower lumbar spine in the midline. Neurological:      General: No focal deficit present. Motor: No weakness.       Deep Tendon Reflexes: Reflexes normal.       Orders Only on 01/21/2021   Component Date Value    Amylase 01/21/2021 35     Color, UA 01/21/2021 YELLOW     Clarity, UA 01/21/2021 Clear     Glucose, Ur 01/21/2021 Negative     Bilirubin Urine 01/21/2021 Negative     Ketones, Urine 01/21/2021 15*    Specific Gravity, UA 01/21/2021 1.014     Blood, Urine 01/21/2021 Negative     pH, UA 01/21/2021 6.0     Protein, UA 01/21/2021 30*    Urobilinogen, Urine 01/21/2021 1.0     Nitrite, Urine 01/21/2021 Negative     Leukocyte Esterase, Urine 01/21/2021 TRACE*    Microscopic Examination 01/21/2021 YES     Urine Type 01/21/2021 Cleancatch     Urine Reflex to Culture 01/21/2021 Not Indicated     Total CK 01/21/2021 48     WBC 01/21/2021 8.7     RBC 01/21/2021 4.33     Hemoglobin 01/21/2021 13.8     Hematocrit 01/21/2021 41.5     MCV 01/21/2021 95.7     MCH 01/21/2021 31.8     MCHC 01/21/2021 33.3     RDW 01/21/2021 13.7     Platelets 00/88/2307 349     MPV 01/21/2021 10.0     Neutrophils % 01/21/2021 74.2     Lymphocytes % 01/21/2021 15.1     Monocytes % 01/21/2021 9.6     Eosinophils % 01/21/2021 0.7     Basophils % 01/21/2021 0.4     Neutrophils Absolute 01/21/2021 6.4     Lymphocytes Absolute 01/21/2021 1.3     Monocytes Absolute 01/21/2021 0. 8     Eosinophils Absolute 01/21/2021 0.1     Basophils Absolute 01/21/2021 0.0     Sodium 01/21/2021 136     Potassium 01/21/2021 4.2     Chloride 01/21/2021 97*    CO2 01/21/2021 26     Anion Gap 01/21/2021 13     Glucose 01/21/2021 87     BUN 01/21/2021 15     CREATININE 01/21/2021 0.8     GFR Non- 01/21/2021 >60     GFR  01/21/2021 >60     Calcium 01/21/2021 9.1     Total Protein 01/21/2021 6.6     Alb 01/21/2021 3.7     Albumin/Globulin Ratio 01/21/2021 1.3     Total Bilirubin 01/21/2021 0.8     Alkaline Phosphatase 01/21/2021 109     ALT 01/21/2021 22     AST 01/21/2021 21     Globulin 01/21/2021 2.9     Urinalysis Comments 01/21/2021 see below     Hyaline Casts, UA 01/21/2021 1     WBC, UA 01/21/2021 8*    RBC, UA 01/21/2021 1     Epithelial Cells, UA 01/21/2021 3     Urine Culture, Routine 01/21/2021 No growth at 18 to 36 hours    Office Visit on 01/18/2021   Component Date Value    SARS-CoV-2, ANGELIC 01/18/2021 NOT DETECTED         This note was generated completely or in part utilizing Dragon dictation speech recognition software. Occasionally, words are mistranscribed and despite editing, the text may contain inaccuracies due to incorrect word recognition. If further clarification is needed please contact the office at [phone]  An electronic signature was used to authenticate this note.     --Aracely Hickman MD

## 2021-01-26 NOTE — ASSESSMENT & PLAN NOTE
Improving, still sleeping in recliner and taking  over-the-counter medication every 4 hours. Very focal midline tenderness at this point with no radiation. X-ray lumbar spine rule out compression fracture. Advised her that due to her history of cancer would optimally do an MRI, however she prefers to hold off. If still with a midline pain requiring medication next week, needs MRI. Patient agrees to contact the office in that case.

## 2021-01-27 NOTE — TELEPHONE ENCOUNTER
From: Mitzi Manning MD  To: Bren Gonzalez  Sent: 1/26/2021 12:20 PM EST  Subject: xray spine    Antonia,  Your lumbar spine has degenerative changes, but no compression fractures etc. Incidentally however the lower thoracic spine (around the level of your lower rib) appears that there may be a compression fracture that is old. I would like you to get a bone density test at your convenience to establish if you other have findings consistent with osteoporosis. There is no rush, but I would have you call [facility], [phone], option 2, option 1 to get this scheduled.

## 2021-02-02 NOTE — TELEPHONE ENCOUNTER
From: Theresa Oneal  To: Arleen Denise MD  Sent: 2/2/2021 3:06 PM EST  Subject: Test Results Question    As requested, I wanted to give you an update. No flu symptoms. Appetite returning. Continue to alternate Tylenol and ibuprofen every 2 hours, but trying to expand the time between doses. Heating pad every 2-3 hours for 20 minutes. I love this! I continue to sleep in reclining chair. Results of CT scan posted. Im not sure how to interpret results of radiology reports.

## 2021-02-08 NOTE — TELEPHONE ENCOUNTER
From: Yazan Mccord  To: Aracely Hickman MD  Sent: 2/8/2021 7:45 AM EST  Subject: Visit Follow-Up Question    Today is the last day of the steroid pack. Such improvement over the weekend! My back pain is now a 10 most of the day. I try to do light housework and get up from working at computer as much as possible. I am finally able to stay focused on work most of the day. I have been sleeping in bed vs reclining chair the past 2 nights using pillow under knees and raised back of bed. I still get exhausted sooner than usual and need breaks. Appetite is still improving. I have lost 12 pounds in the past 3 weeks. Five of these pounds have been since start of steroid pack. Believe me, Im not complaining about weight loss. Appetite is not due to nausea.

## 2021-02-16 PROBLEM — R53.83 FATIGUE: Status: ACTIVE | Noted: 2021-01-01

## 2021-02-16 NOTE — PROGRESS NOTES
2021  [facility] Gonzalez Alberto (:  1960)       ASSESSMENT/PLAN:   1. Acute midline low back pain without sciatica  Comments:  Signif improvement in pain with course of steroid. Ok to cont tylenol prn, until  subsides. CT scan with severe degen changes but not consistent w/ symptoms  2. Fatigue, unspecified type  Assessment & Plan:  New problem x 1 month. Labs were negative for anemia, hypothryoid, kidney or liver dysfunction 21. COVID negative but questionable sample obtained from swab. Repeat the labs and check for COVID antibody. If labs unrevealing, then will proceed with echo. Orders:  -     Covid-19, Antibody, Total; Future  -     Brain Natriuretic Peptide; Future  -     CBC; Future  -     Comprehensive Metabolic Panel; Future  -     C-Reactive Protein; Future               SUBJECTIVE/OBJECTIVE:  57 y.o. female established patient here for:   Chief Complaint   Patient presents with    Back Pain     F/u back pain     Pain is completely relieved with with tylenol and when wears off, is 2/10. Not as comfortable to sleep in bed as in recliner but is doing so now. Still with fatigue. Reminds her of how felt for the year after chemo. Not walking in past month because of pain and weather and wonders if that is contributing.  has commented that she seems short of breath but she dnesi noticing that. Lost 12# over the process. Has maintained the weight loss. Appetite still not completely back to normal    Review of Systems   Constitutional: Positive for fatigue. Negative for chills and fever. Respiratory: Negative for cough. Cardiovascular: Negative for chest pain and leg swelling. Genitourinary: Negative for dysuria and frequency.      Outpatient Medications Marked as Taking for the 21 encounter (Office Visit) with Devang Cutler MD   Medication Sig Dispense Refill    buPROPion (WELLBUTRIN XL) 300 MG extended release tablet TAKE ONE TABLET BY MOUTH EVERY MORNING 30 tablet 3    levothyroxine (SYNTHROID) 88 MCG tablet Take 1 tablet by mouth daily 30 tablet 5     Physical Exam    This note was generated completely or in part utilizing Dragon dictation speech recognition software. Occasionally, words are mistranscribed and despite editing, the text may contain inaccuracies due to incorrect word recognition. If further clarification is needed please contact the office at [phone]  An electronic signature was used to authenticate this note.     --Pepper Miller MD

## 2021-02-24 NOTE — TELEPHONE ENCOUNTER
Left message for patient. Will call back tomorrow to discuss next steps for the back pain and elevated CRP.

## 2021-02-25 NOTE — TELEPHONE ENCOUNTER
Still with significant fatigue. Appetite still not good but hasn't lost any more weight. Diet more refined carbs, due to lack of appetite and queasiness. More cramping with bowel movements. Stools now with hard pellets. Had colonoscopy last June. Has increased water intake but not on fiber. Taking tylenol every 5 hours and remains pain free. If goes longer gets twinges of pain. Asked her to stop the tylenol and report back tomorrow on pain level, and if having any fever, chills or sweats. Need to determine next steps to address symptoms and elevated CRP but want full picture.

## 2021-02-26 NOTE — TELEPHONE ENCOUNTER
From: Neisha Lopez  To: Talbot Ormond, MD  Sent: 2/26/2021 7:36 AM EST  Subject: Test Results Question    I slept through the night without taking any Tylenol. No back pains!

## 2021-04-22 NOTE — PROGRESS NOTES
Subjective:      Patient ID: Cary Quezada is a 57 y.o. female. Patient is here for annual.     Gynecologic Exam        Review of Systems   Constitutional: Negative. HENT: Negative. Eyes: Negative. Respiratory: Negative. Cardiovascular: Negative. Gastrointestinal: Negative. Genitourinary: Negative. Musculoskeletal: Negative. Skin: Negative. Neurological: Negative. Psychiatric/Behavioral: Negative.       Date of Birth 1960  Past Medical History:   Diagnosis Date    Acute midline low back pain without sciatica 2021    Adenomatous colon polyp     Basal cell carcinoma of skin     left upper arm    Degenerative joint disease     Disc disease, degenerative, lumbar or lumbosacral     lumbar area     Diverticulosis     Ectopic pregnancy     History of rectal or anal cancer 2010    Squamous cell of anal canal, s/p chemo and radiation    Hypothyroid 2012    Infertility     treated with pergonol for 5 years 5871-9500, 7 rounds of ivf    Low grade squamous intraepithelial lesion on cytologic smear of anus (LGSIL) 2020    cauterized area    MELANOMA IN SITU     left upper arm    Obesity     Postmenopausal 2010    Urinary incontinence      Past Surgical History:   Procedure Laterality Date    TUNNELED VENOUS PORT PLACEMENT      summer 2009; placement and removal    VARICOSE VEIN SURGERY       OB History    Para Term  AB Living   0 0 0 0 0 0   SAB TAB Ectopic Molar Multiple Live Births   0 0 0 0 0 0     Social History     Socioeconomic History    Marital status:      Spouse name: Yazan Alicea Number of children: 1    Years of education: Not on file    Highest education level: Not on file   Occupational History    Occupation: Professor of dietitics     Employer: [employer] 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 She is not diaphoretic. HENT:      Head: Normocephalic and atraumatic. Nose: Nose normal.      Mouth/Throat:      Mouth: Mucous membranes are moist.      Pharynx: Oropharynx is clear. Eyes:      Pupils: Pupils are equal, round, and reactive to light. Neck:      Musculoskeletal: Normal range of motion and neck supple. No neck rigidity. Thyroid: No thyromegaly. Cardiovascular:      Rate and Rhythm: Normal rate and regular rhythm. Pulses: Normal pulses. Heart sounds: Normal heart sounds. No murmur. No friction rub. No gallop. Pulmonary:      Effort: Pulmonary effort is normal. No respiratory distress. Breath sounds: Normal breath sounds. No stridor. No wheezing, rhonchi or rales. Chest:      Chest wall: No tenderness. Breasts:         Right: Normal. No swelling, bleeding, inverted nipple, mass, nipple discharge, skin change or tenderness. Left: Normal. No swelling, bleeding, inverted nipple, mass, nipple discharge, skin change or tenderness. Abdominal:      General: Abdomen is flat. Bowel sounds are normal. There is no distension. Palpations: Abdomen is soft. There is no hepatomegaly or mass. Tenderness: There is no abdominal tenderness. There is no guarding or rebound. Hernia: No hernia is present. There is no hernia in the left inguinal area. Genitourinary:     General: Normal vulva. Exam position: Lithotomy position. Pubic Area: No rash. Labia:         Right: No rash, tenderness, lesion or injury. Left: No rash, tenderness, lesion or injury. Urethra: No prolapse, urethral pain, urethral swelling or urethral lesion. Vagina: Normal. No signs of injury and foreign body. No vaginal discharge, erythema, tenderness, bleeding, lesions or prolapsed vaginal walls. Cervix: No cervical motion tenderness, discharge, friability, lesion, erythema, cervical bleeding or eversion. Uterus: Not deviated, not enlarged, not fixed, not tender and no uterine prolapse. Adnexa:         Right: No mass, tenderness or fullness. Left: No mass, tenderness or fullness. Rectum: Normal. Guaiac result negative. No mass, tenderness, anal fissure, external hemorrhoid or internal hemorrhoid. Normal anal tone. Comments: Normal urethral meatus, normal urethra, nl bladder  Musculoskeletal: Normal range of motion. General: No tenderness. Lymphadenopathy:      Cervical: No cervical adenopathy. Lower Body: No right inguinal adenopathy. No left inguinal adenopathy. Skin:     General: Skin is warm and dry. Coloration: Skin is not pale. Findings: No erythema or rash. Neurological:      General: No focal deficit present. Mental Status: She is alert and oriented to person, place, and time. Mental status is at baseline. Deep Tendon Reflexes: Reflexes are normal and symmetric. Psychiatric:         Mood and Affect: Mood normal.         Behavior: Behavior normal.         Thought Content: Thought content normal.         Judgment: Judgment normal.         Assessment:      1. Annual  2. menopause      Plan:      1.  Pap, calcium, exercise, mammogram, hemocult negative  2. stable        Desmond Norman MD

## 2021-04-26 NOTE — TELEPHONE ENCOUNTER
From: Tony Dodson  To: Elizabeth Tripp MD  Sent: 4/26/2021 10:07 AM EDT  Subject: Prescription Question    [facility] as Synthroid. I was there yesterday (Sunday). My insurance does not cover the name brand, but covers the generic. Would you please contact [facility] again to make this change?

## 2021-07-02 PROBLEM — R53.83 FATIGUE: Status: RESOLVED | Noted: 2021-01-01 | Resolved: 2021-01-01

## 2021-07-02 NOTE — ASSESSMENT & PLAN NOTE
Continue to work on lifestyle modification.   (Lost weight when she was ill and has gone back to baseline weight)

## 2021-07-02 NOTE — ASSESSMENT & PLAN NOTE
Had resolved after last appointment, and had again after gardening for a few days. Continue to stay active and work on good posture and core strength.

## 2021-07-02 NOTE — PROGRESS NOTES
Jourdan Nova (:  1960)       ASSESSMENT/PLAN:   Acquired hypothyroidism  Assessment & Plan:   Asymptomatic, continue current medications  Orders:  -     TSH with Reflex; Future  Obesity (BMI 30-39. 9)  Assessment & Plan:  Continue to work on lifestyle modification. (Lost weight when she was ill and has gone back to baseline weight)   Elevated sed rate  Comments:  Elevated CRP and sed rate during acute illness this past spring. Symptoms have resolved. Recheck labs to assure resolution. Orders:  -     C-Reactive Protein; Future  -     Sedimentation Rate; Future  Acute midline low back pain without sciatica  Assessment & Plan:  Had resolved after last appointment, and had again after gardening for a few days. Continue to stay active and work on good posture and core strength. Lipid screening  -     Lipid Panel; Future      Return in about 6 months (around 2022) for CPE. SUBJECTIVE:  57 y.o. female established patient here for:   Chief Complaint   Patient presents with    Follow-up     Doing well. Anal dysplasia last year. Followed now by Dr. Gemma Perrin at [facility]. Pre cancer lesion removed by Dr. Frances Kirkland, seeing q 6    Review of Systems  Outpatient Medications Marked as Taking for the 21 encounter (Office Visit) with Mikey Gandara MD   Medication Sig Dispense Refill    IBUPROFEN PO Take by mouth as needed      FIBER PO Take by mouth daily      buPROPion (WELLBUTRIN XL) 300 MG extended release tablet TAKE ONE TABLET BY MOUTH EVERY MORNING 30 tablet 3    levothyroxine (SYNTHROID) 88 MCG tablet Take 1 tablet by mouth daily 30 tablet 5       OBJECTIVE:  Vitals:    21 0951   BP: 118/70   Pulse: 76   SpO2: 99%   Weight: 233 lb 3.2 oz (105.8 kg)     Physical Exam  Constitutional:       Appearance: Normal appearance. Neck:      Thyroid: No thyromegaly. Cardiovascular:      Heart sounds: Normal heart sounds.    Pulmonary:      Effort: Pulmonary effort is normal.      Breath sounds: Normal breath sounds. Musculoskeletal:      Right lower leg: No edema. Left lower leg: No edema. Skin:     Coloration: Skin is not pale. Neurological:      Mental Status: She is alert. Psychiatric:         Mood and Affect: Mood normal.         This note was generated completely or in part utilizing Dragon dictation speech recognition software. Occasionally, words are mistranscribed and despite editing, the text may contain inaccuracies due to incorrect word recognition.   If further clarification is needed please contact the office at [phone]    --Michelle Rodríguez MD

## 2021-11-03 NOTE — TELEPHONE ENCOUNTER
We are booked today so patient is going to go to the [address] [place] [address].   Note completed

## 2021-11-03 NOTE — TELEPHONE ENCOUNTER
From: Martina Mckenzie  To: Tana Paul MD  Sent: 11/3/2021 10:46 AM EDT  Subject: Non-Urgent Medical Question    I had a nagging cough and fatigue for about 1 1/2 weeks. With the frost last night, cough has almost subsided. The fatigue remains. No fever. No back pain! I have been traveling for work, however have been virtual since last week. Should I set up a virtual visit?

## 2021-11-08 PROBLEM — M54.50 ACUTE MIDLINE LOW BACK PAIN WITHOUT SCIATICA: Status: RESOLVED | Noted: 2021-01-01 | Resolved: 2021-01-01

## 2021-11-08 NOTE — TELEPHONE ENCOUNTER
Please call and see if she is able to come in tomorrow at 2. If not able, cancel from schedule. I added so as not to lose the appointment slot.

## 2021-11-09 PROBLEM — R05.9 COUGH: Status: ACTIVE | Noted: 2021-01-01

## 2021-11-09 NOTE — ASSESSMENT & PLAN NOTE
New problem, not improving after 2 weeks. Feel most likely had a viral syndrome and is gone into reactive airway however need to rule out an atypical pneumonia or other abnormality. Chest x-ray. Labs   Prednisone taper. Albuterol as needed.   Tessalon as needed

## 2021-11-09 NOTE — ASSESSMENT & PLAN NOTE
Recent onset with cough. Check CBC, TSH, CMP. Of note, patient states she  has missed her levothyroxine at least 6 doses in the past month.   (3 days on 2 separate trips)

## 2021-11-09 NOTE — PROGRESS NOTES
Shreya Hollingsworth   :  1960    ASSESSMENT/PLAN:   Cough  Assessment & Plan:  New problem, not improving after 2 weeks. Feel most likely had a viral syndrome and is gone into reactive airway however need to rule out an atypical pneumonia or other abnormality. Chest x-ray. Labs   Prednisone taper. Albuterol as needed. Tessalon as needed  Orders:  -     Comprehensive Metabolic Panel; Future  -     Hemoglobin A1C; Future  -     TSH with Reflex; Future  -     CBC Auto Differential; Future  -     XR CHEST (2 VW); Future  -     Immunofixation serum profile; Future  Fatigue, unspecified type  Assessment & Plan:  Recent onset with cough. Check CBC, TSH, CMP. Of note, patient states she  has missed her levothyroxine at least 6 doses in the past month. (3 days on 2 separate trips)   Orders:  -     Comprehensive Metabolic Panel; Future  -     Hemoglobin A1C; Future  -     TSH with Reflex; Future  -     CBC Auto Differential; Future  -     XR CHEST (2 VW); Future  -     Immunofixation serum profile; Future  Acquired hypothyroidism  Assessment & Plan:  As noted above, more fatigued. May be viral.  Checking to see how far TSH is out of range     F/u as advised at last routine appointment, or sooner if fails to improve. Addendum: Chest x-ray with right middle lobe opacity and bilateral hazy infiltrates. Adding Z-Jay to her prescriptions and ordering chest CT      SUBJECTIVE:  57 y.o. female established patient here for:   Chief Complaint   Patient presents with    Cough     Covid test 21-negative. Day 15 of cough     Dry cough, with rare exception can bring up a small amount of clear phlegm. Talking will trigger the cough and any acctivity. Doesn't awaken. Short of breath with activity, even showering.      Review of Systems    Outpatient Medications Marked as Taking for the 21 encounter (Office Visit) with Modesto Vivas MD   Medication Sig Dispense Refill    predniSONE (DELTASONE) 10 MG tablet Take 4 tabs for 2 days, then 3 tabs for 2 days, then 2 tabs for 2 days, then 1 tab for 2 days. Take with food. 20 tablet 0    benzonatate (TESSALON) 100 MG capsule Take 1-2 every 8 hours as needed for cough. 30 capsule 0    albuterol sulfate HFA (VENTOLIN HFA) 108 (90 Base) MCG/ACT inhaler Inhale 2 puffs into the lungs 4 times daily as needed for Wheezing or Shortness of Breath 18 g 0    Spacer/Aero-Holding Chambers SOY 1 Device by Does not apply route daily as needed (cough) 1 each 0    levothyroxine (SYNTHROID) 88 MCG tablet TAKE ONE TABLET BY MOUTH DAILY 30 tablet 5    IBUPROFEN PO Take by mouth as needed      FIBER PO Take by mouth daily      buPROPion (WELLBUTRIN XL) 300 MG extended release tablet TAKE ONE TABLET BY MOUTH EVERY MORNING 30 tablet 3       OBJECTIVE:  Vitals:    11/09/21 1353   BP: 132/88   Site: Right Upper Arm   Position: Sitting   Cuff Size: Medium Adult   Pulse: 78   Resp: 18   Temp: 97.9 °F (36.6 °C)   TempSrc: Oral   SpO2: 98%   Weight: 223 lb (101.2 kg)   Height: 5' 8\" (1.727 m)     Physical Exam  Constitutional:       Appearance: She is not ill-appearing. Cardiovascular:      Heart sounds: Normal heart sounds. Pulmonary:      Effort: Pulmonary effort is normal.      Breath sounds: Wheezing (Occasional end expiratory wheeze) present. No rales. Comments: Frequent dry coughing throughout  Lymphadenopathy:      Cervical: No cervical adenopathy. Skin:     Coloration: Skin is not pale. This note was generated completely or in part utilizing Dragon dictation speech recognition software. Occasionally, words are mistranscribed and despite editing, the text may contain inaccuracies due to incorrect word recognition.   If further clarification is needed please contact the office at [phone]  --Francoise Cortes MD

## 2021-11-10 NOTE — TELEPHONE ENCOUNTER
Grey Alaniz with [facility] is calling to let us know CNP was rejected because of Hemolyz (red blood cells burst/are ). Any questions give Grey Alaniz a call at number provided.

## 2021-11-11 NOTE — TELEPHONE ENCOUNTER
Spoke with patient regarding results of chest CT. Will add second antibiotic, amoxicilin 1 g tid to the zpak for full pneumonia coverage. PET Ct scheduled. Referral to Dr. Sejal Workman for further evaluation.

## 2021-11-11 NOTE — TELEPHONE ENCOUNTER
Reviewed results with SCOOTER CASTILLO @ [facility]. Confirmed receipt of report.  Will forward to Dr Yesenia Caruso to review as well

## 2021-11-11 NOTE — TELEPHONE ENCOUNTER
I'd like to move her chest CT up. Will put in new order for stat. Please see if can arrange today. Sending her MyChart, but may not have seen by time you call her.

## 2021-11-11 NOTE — TELEPHONE ENCOUNTER
From: Jessica Russo  To: Dr. Madelin Baca  Sent: 11/11/2021 8:59 AM EST  Subject: Update    I just took the third dose of antibiotic and prednisone this morning. Taking cough and albuterol as prescribed. No change in cough or fatigue. However, it is not worse.

## 2021-11-15 NOTE — PROGRESS NOTES
[facility]    Outpatient Note    Subjective:   CHIEF COMPLAINT:   Chief Complaint   Patient presents with    Abnormal CT       HPI:     The patient is 57 y.o. female who presents today for a new patient visit for evaluation of abnormal CT. She is complaining of fatigue and cough over the past four weeks progressively getting worse. She has abnormal CXR followed by CT. She had anal cancer 10 years ago. She was treated with chemo and radiation. Her father had metastatic colon cancer. She also  has hx of melanoma in situ. It was treated surgically over 10 years ago. She follow with dermatology every 9 months. She has cough is with clear sputum. There is no fever. She lost ~ 5 lbs within 2 weeks. Her appetite is not as good.         Never smoker     Past Medical History:    Past Medical History:   Diagnosis Date    Acute midline low back pain without sciatica 01/26/2021    Adenomatous colon polyp     Basal cell carcinoma of skin     left upper arm    Degenerative joint disease     Disc disease, degenerative, lumbar or lumbosacral     lumbar area 1997    Diverticulosis     Ectopic pregnancy     History of rectal or anal cancer 04/2010    Squamous cell of anal canal, s/p chemo and radiation    Hypothyroid 09/2012    Infertility     treated with pergonol for 5 years 3196-6523, 7 rounds of ivf    Low grade squamous intraepithelial lesion on cytologic smear of anus (LGSIL) 12/2020    cauterized area    MELANOMA IN SITU 2006    left upper arm    Obesity     Postmenopausal 2010    Urinary incontinence        Social History:    Social History     Tobacco Use   Smoking Status Never Smoker   Smokeless Tobacco Never Used       Family History:  Family History   Problem Relation Age of Onset    High Blood Pressure Mother     Colon Cancer Father 61    High Blood Pressure Father     Hypertension Brother     Breast Cancer Maternal Aunt     Breast Cancer Maternal Aunt        Current Medications:  Current Outpatient Medications on File Prior to Visit   Medication Sig Dispense Refill    amoxicillin (AMOXIL) 500 MG capsule Take 2 capsules by mouth 3 times daily for 7 days 42 capsule 0    LORazepam (ATIVAN) 0.5 MG tablet 1-2 tablets 30-60 minutes prior to procedure. May also use at bedtime prn anxiety. Do not exceed 2 tablets every 8 hours. 12 tablet 0    predniSONE (DELTASONE) 10 MG tablet Take 4 tabs for 2 days, then 3 tabs for 2 days, then 2 tabs for 2 days, then 1 tab for 2 days. Take with food. 20 tablet 0    benzonatate (TESSALON) 100 MG capsule Take 1-2 every 8 hours as needed for cough. 30 capsule 0    albuterol sulfate HFA (VENTOLIN HFA) 108 (90 Base) MCG/ACT inhaler Inhale 2 puffs into the lungs 4 times daily as needed for Wheezing or Shortness of Breath 18 g 0    Spacer/Aero-Holding Chambers SOY 1 Device by Does not apply route daily as needed (cough) 1 each 0    azithromycin (ZITHROMAX Z-MILY) 250 MG tablet Take 2 tablets daily on day 1, then 1 tablet daily on days 2-5. 6 tablet 0    levothyroxine (SYNTHROID) 88 MCG tablet TAKE ONE TABLET BY MOUTH DAILY 30 tablet 5    IBUPROFEN PO Take by mouth as needed      FIBER PO Take by mouth daily      buPROPion (WELLBUTRIN XL) 300 MG extended release tablet TAKE ONE TABLET BY MOUTH EVERY MORNING (Patient not taking: Reported on 11/15/2021) 30 tablet 3     No current facility-administered medications on file prior to visit. REVIEW OF SYSTEMS:    Review of Systems   Constitutional: Positive for fatigue. Negative for chills, fever and unexpected weight change. HENT: Negative for congestion. Respiratory: Positive for cough. Negative for chest tightness, shortness of breath and wheezing. Cardiovascular: Negative for chest pain, palpitations and leg swelling. Neurological: Negative for weakness. Psychiatric/Behavioral: The patient is not nervous/anxious.     All other systems reviewed and are negative. Objective:   PHYSICAL EXAM:        VITALS:  /79 (Site: Left Upper Arm, Position: Sitting, Cuff Size: Medium Adult)   Pulse 85   Temp 96 °F (35.6 °C) (Infrared)   Ht 5' 8\" (1.727 m)   Wt 218 lb (98.9 kg)   SpO2 96%   Breastfeeding No   BMI 33.15 kg/m²     Physical Exam  Vitals reviewed. Constitutional:       Appearance: She is well-developed. HENT:      Head: Normocephalic and atraumatic. Eyes:      Pupils: Pupils are equal, round, and reactive to light. Neck:      Vascular: No JVD. Cardiovascular:      Rate and Rhythm: Normal rate and regular rhythm. Heart sounds: No murmur heard. Pulmonary:      Effort: Pulmonary effort is normal. No respiratory distress. Breath sounds: Normal breath sounds. No stridor. No wheezing or rales. Abdominal:      General: Bowel sounds are normal.      Palpations: Abdomen is soft. Musculoskeletal:         General: No deformity. Cervical back: Neck supple. Right lower leg: No edema. Left lower leg: No edema. Skin:     General: Skin is warm and dry. Neurological:      Mental Status: She is alert and oriented to person, place, and time. Psychiatric:         Behavior: Behavior normal.         DATA:    CT chest wo contrast on 11/11/21  1. Multiple bilateral pulmonary nodules and consolidation, likely a   combination of malignancy and pneumonia.  If it will change clinical   management, recommend further evaluation with PET-CT. 2. Mediastinal and left supraclavicular adenopathy concerning for cheng   metastasis. 3. Multiple hepatic lesions concerning for metastatic disease.  If it will   change clinical management, recommend MRI of the abdomen with without   contrast utilizing Eovist.   4. Mild interstitial edema.         CXR on 11/9/21  Impression: Focal airspace opacity of the right middle lobe. Assessment:      Diagnosis Orders   1.  Nodule on liver  CT NEEDLE BIOPSY LIVER PERCUTANEOUS    Amb External Referral To Hematology Oncology    guaiFENesin-codeine (Carolinaisabella Murry) 100-10 MG/5ML syrup       Plan:   57year old female with hx of anal cancer s/p chemo and radiation and hx of melanoma in situ s/p excision presented with cough and progressive fatigue. CT scan is consistent with extensive metastatic disease to the liver and lungs with mediastinal and supraclavicular LAP. Plan   Will get liver biopsy. Discussed with IR  Start Codeine cough syrup. Will refer back to oncology   She prefers to discuss biopsy results in person in the office. Will see her back 5 days after the biopsy.

## 2021-11-16 NOTE — TELEPHONE ENCOUNTER
[facility]  CT NEEDLE BIOPSY LIVER PERCUTANEOUS  Arrive 7am   Biopsy 830am  * Nothing by mouth 4 hours prior to the procedure. * Patient needs to be off blood thinners including aspirin five days prior to the procedure (* Arrive 120 minutes prior to scheduled time for pt/ptt blood draw  * Patient should  park at the [facility] and then take the \"A\" Elevators to the 1st floor Out Patient Diagnostic Services area for Registration  * If patient has had PT/ PTT/ INR labs drawn, results are good for 72hrs.   * Patient must have someone to drive them home after the procedure  *  Patient should be prepared to stay 3-4 hours after the exam.  Bring any related images performed at another facility    Written instructions given in office   Called with time and date- verbalized understanding

## 2021-11-17 NOTE — TELEPHONE ENCOUNTER
Spoke w ith patient. Will cancel the PET scan. Get liver biopsy and see Dr. Home Martinez and then an determine if PET would have added value.

## 2021-11-17 NOTE — TELEPHONE ENCOUNTER
From: Librado Winkler  To: Dr. Lupis Arreguin  Sent: 11/15/2021 8:58 AM EST  Subject: PET scan    Scheduled for Monday, Nov 22 at [facility]. It was the first opening. I did not see the appt in MyChart, but I did get an email confirmation.

## 2021-11-18 NOTE — TELEPHONE ENCOUNTER
Please fax patients most recent imaging and order for Pet CT to jacqueline so that the patient can sty on the schedule to be seen this coming Monday.           Send to [phone]

## 2021-11-22 NOTE — SEDATION DOCUMENTATION
Successful CT guided liver biopsy by Rosario Diamond MD with 50mcg fentanyl and 1mg versed. Tolerated well with sedation, no complications, [facility], transported to [facility].

## 2021-11-22 NOTE — PROGRESS NOTES
CT guided percutaneous core biopsy of left lobe liver mass lesion. 17 gauge introducer with coaxial 18 gauge core needle. 3 passes. No immediate complications.      Estimated blood loss: 0 mL

## 2021-11-22 NOTE — PROGRESS NOTES
Arrived to [facility]  Post liver biopsy. Denies pain or nausea, orange juice provided along with water. Mid upper abdominal site soft with no redness or drainage.

## 2021-11-22 NOTE — PROGRESS NOTES
dsd with tegaderm dry and intact to upper abdomen, abdomen soft, no creptus at site, pt sat on side of bed, minimal dizziness, unproductive cough, fluids encouraged. 1100 up to side of bed, no dizziness at this time, walked to w/c with no issues. Pt going home and taking cough medicine. Discharge instructions with pt/.

## 2021-11-24 NOTE — TELEPHONE ENCOUNTER
Last appointment: 11/9/2021  Next appointment: 1/4/2022  Last refill: 11/17/2021, 120 mL    [facility], [facility]

## 2021-11-24 NOTE — TELEPHONE ENCOUNTER
Dr. Umang Weems sent over a RX for Hydrochodone cough syrup to [facility] in [place] earlier today and patient's  is there now but pharmacy states they need authorization from Dr. Umang Weems because too soon for re-fill. Please have Dr. Romero Willard contact [address] in [place] to let them know okay to re-fill. Let patient's  know at number provided once done.